# Patient Record
Sex: MALE | Race: WHITE | NOT HISPANIC OR LATINO | Employment: FULL TIME | URBAN - METROPOLITAN AREA
[De-identification: names, ages, dates, MRNs, and addresses within clinical notes are randomized per-mention and may not be internally consistent; named-entity substitution may affect disease eponyms.]

---

## 2023-03-14 LAB — SARS-COV-2 RESULT: NOT DETECTED

## 2023-04-27 LAB
ALANINE AMINOTRANSFERASE (SGPT) (U/L) IN SER/PLAS: 10 U/L (ref 10–52)
ALBUMIN (G/DL) IN SER/PLAS: 4.6 G/DL (ref 3.4–5)
ALKALINE PHOSPHATASE (U/L) IN SER/PLAS: 60 U/L (ref 33–136)
ANION GAP IN SER/PLAS: 12 MMOL/L (ref 10–20)
ASPARTATE AMINOTRANSFERASE (SGOT) (U/L) IN SER/PLAS: 11 U/L (ref 9–39)
BILIRUBIN TOTAL (MG/DL) IN SER/PLAS: 0.9 MG/DL (ref 0–1.2)
CALCIDIOL (25 OH VITAMIN D3) (NG/ML) IN SER/PLAS: 39 NG/ML
CALCIUM (MG/DL) IN SER/PLAS: 9.9 MG/DL (ref 8.6–10.3)
CARBON DIOXIDE, TOTAL (MMOL/L) IN SER/PLAS: 31 MMOL/L (ref 21–32)
CHLORIDE (MMOL/L) IN SER/PLAS: 99 MMOL/L (ref 98–107)
CHOLESTEROL (MG/DL) IN SER/PLAS: 176 MG/DL (ref 0–199)
CHOLESTEROL IN HDL (MG/DL) IN SER/PLAS: 38.4 MG/DL
CHOLESTEROL/HDL RATIO: 4.6
CREATININE (MG/DL) IN SER/PLAS: 0.77 MG/DL (ref 0.5–1.3)
ESTIMATED AVERAGE GLUCOSE FOR HBA1C: 240 MG/DL
GFR MALE: >90 ML/MIN/1.73M2
GLUCOSE (MG/DL) IN SER/PLAS: 262 MG/DL (ref 74–99)
HEMOGLOBIN A1C/HEMOGLOBIN TOTAL IN BLOOD: 10 %
LDL: 82 MG/DL (ref 0–99)
NON HDL CHOLESTEROL: 138 MG/DL
POTASSIUM (MMOL/L) IN SER/PLAS: 4.1 MMOL/L (ref 3.5–5.3)
PROSTATE SPECIFIC AG (NG/ML) IN SER/PLAS: <0.1 NG/ML (ref 0–4)
PROTEIN TOTAL: 7.6 G/DL (ref 6.4–8.2)
SODIUM (MMOL/L) IN SER/PLAS: 138 MMOL/L (ref 136–145)
TRIGLYCERIDE (MG/DL) IN SER/PLAS: 276 MG/DL (ref 0–149)
UREA NITROGEN (MG/DL) IN SER/PLAS: 13 MG/DL (ref 6–23)
VLDL: 55 MG/DL (ref 0–40)

## 2023-07-12 ENCOUNTER — HOSPITAL ENCOUNTER (OUTPATIENT)
Dept: DATA CONVERSION | Facility: HOSPITAL | Age: 63
End: 2023-07-12
Attending: ANESTHESIOLOGY | Admitting: ANESTHESIOLOGY
Payer: COMMERCIAL

## 2023-07-12 DIAGNOSIS — M48.061 SPINAL STENOSIS, LUMBAR REGION WITHOUT NEUROGENIC CLAUDICATION: ICD-10-CM

## 2023-07-12 DIAGNOSIS — M54.9 DORSALGIA, UNSPECIFIED: ICD-10-CM

## 2023-07-25 LAB — PROSTATE SPECIFIC AG (NG/ML) IN SER/PLAS: <0.1 NG/ML (ref 0–4)

## 2023-09-06 LAB
ALANINE AMINOTRANSFERASE (SGPT) (U/L) IN SER/PLAS: 11 U/L (ref 10–52)
ALBUMIN (G/DL) IN SER/PLAS: 4.2 G/DL (ref 3.4–5)
ALKALINE PHOSPHATASE (U/L) IN SER/PLAS: 59 U/L (ref 33–136)
ANION GAP IN SER/PLAS: 8 MMOL/L (ref 10–20)
ASPARTATE AMINOTRANSFERASE (SGOT) (U/L) IN SER/PLAS: 13 U/L (ref 9–39)
BILIRUBIN TOTAL (MG/DL) IN SER/PLAS: 0.8 MG/DL (ref 0–1.2)
CALCIUM (MG/DL) IN SER/PLAS: 9.8 MG/DL (ref 8.6–10.3)
CARBON DIOXIDE, TOTAL (MMOL/L) IN SER/PLAS: 34 MMOL/L (ref 21–32)
CHLORIDE (MMOL/L) IN SER/PLAS: 103 MMOL/L (ref 98–107)
CHOLESTEROL (MG/DL) IN SER/PLAS: 161 MG/DL (ref 0–199)
CHOLESTEROL IN HDL (MG/DL) IN SER/PLAS: 29.7 MG/DL
CHOLESTEROL/HDL RATIO: 5.4
CREATININE (MG/DL) IN SER/PLAS: 0.89 MG/DL (ref 0.5–1.3)
ESTIMATED AVERAGE GLUCOSE FOR HBA1C: 223 MG/DL
GFR MALE: >90 ML/MIN/1.73M2
GLUCOSE (MG/DL) IN SER/PLAS: 196 MG/DL (ref 74–99)
HEMOGLOBIN A1C/HEMOGLOBIN TOTAL IN BLOOD: 9.4 %
LDL: 85 MG/DL (ref 0–99)
NON HDL CHOLESTEROL: 131 MG/DL
POTASSIUM (MMOL/L) IN SER/PLAS: 4.4 MMOL/L (ref 3.5–5.3)
PROTEIN TOTAL: 7.1 G/DL (ref 6.4–8.2)
SODIUM (MMOL/L) IN SER/PLAS: 141 MMOL/L (ref 136–145)
TRIGLYCERIDE (MG/DL) IN SER/PLAS: 232 MG/DL (ref 0–149)
UREA NITROGEN (MG/DL) IN SER/PLAS: 16 MG/DL (ref 6–23)
VLDL: 46 MG/DL (ref 0–40)

## 2023-09-13 ENCOUNTER — HOSPITAL ENCOUNTER (OUTPATIENT)
Dept: DATA CONVERSION | Facility: HOSPITAL | Age: 63
End: 2023-09-13
Attending: ANESTHESIOLOGY | Admitting: ANESTHESIOLOGY
Payer: COMMERCIAL

## 2023-09-13 DIAGNOSIS — M48.061 SPINAL STENOSIS, LUMBAR REGION WITHOUT NEUROGENIC CLAUDICATION: ICD-10-CM

## 2023-09-29 VITALS — BODY MASS INDEX: 27.86 KG/M2 | WEIGHT: 205.69 LBS | HEIGHT: 72 IN

## 2023-09-29 VITALS — BODY MASS INDEX: 27.86 KG/M2 | HEIGHT: 72 IN | WEIGHT: 205.69 LBS

## 2023-10-17 PROBLEM — M62.838 MUSCLE SPASM: Status: ACTIVE | Noted: 2023-10-17

## 2023-10-17 PROBLEM — R35.0 FREQUENCY OF URINATION: Status: ACTIVE | Noted: 2023-10-17

## 2023-10-17 PROBLEM — N13.8 BPH WITH OBSTRUCTION/LOWER URINARY TRACT SYMPTOMS: Status: ACTIVE | Noted: 2023-10-17

## 2023-10-17 PROBLEM — R39.11 HESITANCY: Status: ACTIVE | Noted: 2023-10-17

## 2023-10-17 PROBLEM — C61 PROSTATE CANCER (MULTI): Status: ACTIVE | Noted: 2023-10-17

## 2023-10-17 PROBLEM — R04.0 EPISTAXIS: Status: ACTIVE | Noted: 2023-10-17

## 2023-10-17 PROBLEM — F41.9 ANXIETY: Status: ACTIVE | Noted: 2023-10-17

## 2023-10-17 PROBLEM — R07.89 CHEST WALL PAIN: Status: ACTIVE | Noted: 2023-10-17

## 2023-10-17 PROBLEM — M54.16 LUMBAR RADICULAR PAIN: Status: ACTIVE | Noted: 2023-10-17

## 2023-10-17 PROBLEM — M17.11 OSTEOARTHRITIS OF RIGHT KNEE: Status: ACTIVE | Noted: 2023-10-17

## 2023-10-17 PROBLEM — N40.1 BPH WITH OBSTRUCTION/LOWER URINARY TRACT SYMPTOMS: Status: ACTIVE | Noted: 2023-10-17

## 2023-10-17 PROBLEM — M51.369 OTHER INTERVERTEBRAL DISC DEGENERATION, LUMBAR REGION: Status: ACTIVE | Noted: 2023-10-17

## 2023-10-17 PROBLEM — M94.0 COSTOCHONDRITIS, ACUTE: Status: ACTIVE | Noted: 2023-10-17

## 2023-10-17 PROBLEM — R39.12 WEAK URINARY STREAM: Status: ACTIVE | Noted: 2023-10-17

## 2023-10-17 PROBLEM — M51.36 OTHER INTERVERTEBRAL DISC DEGENERATION, LUMBAR REGION: Status: ACTIVE | Noted: 2023-10-17

## 2023-10-17 PROBLEM — K11.8 PAROTID MASS: Status: ACTIVE | Noted: 2023-10-17

## 2023-10-17 PROBLEM — M48.07 SPINAL STENOSIS OF LUMBOSACRAL REGION: Status: ACTIVE | Noted: 2023-10-17

## 2023-10-17 PROBLEM — R91.1 PULMONARY NODULE: Status: ACTIVE | Noted: 2023-10-17

## 2023-10-17 PROBLEM — M18.11 PRIMARY OSTEOARTHRITIS OF FIRST CARPOMETACARPAL JOINT OF RIGHT HAND: Status: ACTIVE | Noted: 2023-10-17

## 2023-10-17 RX ORDER — BLOOD-GLUCOSE SENSOR
EACH MISCELLANEOUS
COMMUNITY
Start: 2023-09-08

## 2023-10-17 RX ORDER — CYCLOBENZAPRINE HCL 10 MG
1 TABLET ORAL NIGHTLY PRN
COMMUNITY
Start: 2022-09-29

## 2023-10-17 RX ORDER — GABAPENTIN 300 MG/1
CAPSULE ORAL
COMMUNITY
Start: 2023-07-03 | End: 2023-11-10

## 2023-10-17 RX ORDER — CARBOXYMETHYLCELLULOSE SODIUM 0.5 %
17 DROPPERETTE, SINGLE-USE DROP DISPENSER OPHTHALMIC (EYE) DAILY PRN
COMMUNITY
Start: 2023-03-17

## 2023-10-17 RX ORDER — OMEGA-3 FATTY ACIDS 1000 MG
1 CAPSULE ORAL 2 TIMES DAILY
COMMUNITY

## 2023-10-17 RX ORDER — SILDENAFIL 100 MG/1
TABLET, FILM COATED ORAL
COMMUNITY
Start: 2023-09-09

## 2023-10-17 RX ORDER — IBUPROFEN 600 MG/1
600 TABLET ORAL 3 TIMES DAILY PRN
COMMUNITY
End: 2023-10-18 | Stop reason: SDUPTHER

## 2023-10-17 RX ORDER — PEN NEEDLE, DIABETIC 31 GX5/16"
NEEDLE, DISPOSABLE MISCELLANEOUS
COMMUNITY
Start: 2023-05-31

## 2023-10-17 RX ORDER — GLIMEPIRIDE 4 MG/1
1 TABLET ORAL DAILY
COMMUNITY

## 2023-10-17 RX ORDER — LISINOPRIL 5 MG/1
TABLET ORAL
COMMUNITY
Start: 2019-09-13

## 2023-10-17 RX ORDER — ERGOCALCIFEROL 1.25 MG/1
CAPSULE ORAL
COMMUNITY
Start: 2019-09-13 | End: 2023-11-10 | Stop reason: SDUPTHER

## 2023-10-17 RX ORDER — NAPROXEN SODIUM 220 MG/1
1 TABLET, FILM COATED ORAL DAILY
COMMUNITY

## 2023-10-17 RX ORDER — CLOPIDOGREL BISULFATE 75 MG/1
TABLET, FILM COATED ORAL
COMMUNITY

## 2023-10-17 RX ORDER — TAMSULOSIN HYDROCHLORIDE 0.4 MG/1
0.4 CAPSULE ORAL NIGHTLY
COMMUNITY

## 2023-10-17 RX ORDER — EMPAGLIFLOZIN 25 MG/1
TABLET, FILM COATED ORAL
COMMUNITY
Start: 2023-06-25

## 2023-10-17 RX ORDER — NITROGLYCERIN 0.4 MG/1
TABLET SUBLINGUAL
COMMUNITY

## 2023-10-17 RX ORDER — ATORVASTATIN CALCIUM 40 MG/1
TABLET, FILM COATED ORAL
COMMUNITY
Start: 2020-05-28

## 2023-10-17 RX ORDER — METFORMIN HYDROCHLORIDE 500 MG/1
1 TABLET ORAL 2 TIMES DAILY
COMMUNITY

## 2023-10-17 RX ORDER — INSULIN LISPRO 100 [IU]/ML
INJECTION, SOLUTION INTRAVENOUS; SUBCUTANEOUS
COMMUNITY
Start: 2023-05-31

## 2023-10-17 RX ORDER — ASPIRIN 325 MG
1 TABLET, DELAYED RELEASE (ENTERIC COATED) ORAL
COMMUNITY

## 2023-10-17 RX ORDER — GLUCOSAMINE/CHONDR SU A SOD 167-133 MG
1 CAPSULE ORAL DAILY
COMMUNITY

## 2023-10-17 RX ORDER — DIAZEPAM 5 MG/1
TABLET ORAL
COMMUNITY
Start: 2023-06-14

## 2023-10-17 RX ORDER — OXYCODONE AND ACETAMINOPHEN 5; 325 MG/1; MG/1
1 TABLET ORAL EVERY 6 HOURS PRN
COMMUNITY
Start: 2022-09-23

## 2023-10-17 RX ORDER — FENOFIBRATE 200 MG/1
CAPSULE ORAL
COMMUNITY
Start: 2019-07-23

## 2023-10-17 RX ORDER — TADALAFIL 5 MG/1
5 TABLET ORAL DAILY
COMMUNITY
Start: 2023-04-11

## 2023-10-18 ENCOUNTER — OFFICE VISIT (OUTPATIENT)
Dept: PAIN MEDICINE | Facility: CLINIC | Age: 63
End: 2023-10-18
Payer: COMMERCIAL

## 2023-10-18 VITALS
HEIGHT: 72 IN | TEMPERATURE: 97 F | WEIGHT: 203 LBS | BODY MASS INDEX: 27.5 KG/M2 | OXYGEN SATURATION: 95 % | DIASTOLIC BLOOD PRESSURE: 66 MMHG | HEART RATE: 78 BPM | SYSTOLIC BLOOD PRESSURE: 125 MMHG | RESPIRATION RATE: 16 BRPM

## 2023-10-18 DIAGNOSIS — M48.07 SPINAL STENOSIS OF LUMBOSACRAL REGION: Primary | ICD-10-CM

## 2023-10-18 DIAGNOSIS — M54.16 LUMBAR RADICULOPATHY: ICD-10-CM

## 2023-10-18 PROCEDURE — 99213 OFFICE O/P EST LOW 20 MIN: CPT | Performed by: ANESTHESIOLOGY

## 2023-10-18 RX ORDER — IBUPROFEN 600 MG/1
600 TABLET ORAL 3 TIMES DAILY PRN
Qty: 90 TABLET | Refills: 0 | Status: SHIPPED | OUTPATIENT
Start: 2023-10-18 | End: 2023-11-17

## 2023-10-18 RX ORDER — CALCIUM CARBONATE/VITAMIN D3 600MG-62.5
CAPSULE ORAL
COMMUNITY

## 2023-10-18 ASSESSMENT — ENCOUNTER SYMPTOMS
FEVER: 0
BACK PAIN: 1
SHORTNESS OF BREATH: 0

## 2023-10-18 ASSESSMENT — PAIN SCALES - GENERAL: PAINLEVEL_OUTOF10: 6

## 2023-10-18 ASSESSMENT — PAIN - FUNCTIONAL ASSESSMENT: PAIN_FUNCTIONAL_ASSESSMENT: 0-10

## 2023-10-18 ASSESSMENT — LIFESTYLE VARIABLES: TOTAL SCORE: 0

## 2023-10-18 ASSESSMENT — PAIN DESCRIPTION - DESCRIPTORS: DESCRIPTORS: SHARP

## 2023-10-18 NOTE — PROGRESS NOTES
Pt c/o low back pain that radiated down his left leg.  Had LESI on 9/13 that did not help this time.

## 2023-10-18 NOTE — H&P (VIEW-ONLY)
Chief Complain  Back Pain       History Of Present Illness  Serafin Saglado is a 63 y.o. male here for left sided low back pain radiating to left thigh . The patient rates the pain at 4-5  on a scale from 0-10.  The patient describes pain as sharp, dull.  The pain is worsened by walking and is alleviated by sitting.  Since the last visit the pain has stayed the same.  The patient denies any fever, chills, weight loss, bladder/bowel incontinence.     Previous Procedures:  L2-3 SUKHI X 2 : First one helped for 2 months and second one did not help much    Past Medical History  He has no past medical history on file.    Surgical History  He has no past surgical history on file.     Social History  He reports that he has been smoking cigarettes. He has a 20.00 pack-year smoking history. He does not have any smokeless tobacco history on file. He reports that he does not currently use alcohol. He reports that he does not currently use drugs.    Family History  No family history on file.     Allergies  Gabapentin    Review of Systems  Review of Systems   Constitutional:  Negative for fever.   Respiratory:  Negative for shortness of breath.    Cardiovascular:  Negative for chest pain.   Musculoskeletal:  Positive for back pain.   Psychiatric/Behavioral:  Negative for suicidal ideas.         Physical Exam  Physical Exam  HENT:      Head: Normocephalic and atraumatic.   Eyes:      Extraocular Movements: Extraocular movements intact.      Pupils: Pupils are equal, round, and reactive to light.   Pulmonary:      Effort: Pulmonary effort is normal.   Musculoskeletal:      Cervical back: Neck supple.   Neurological:      Mental Status: He is alert.   Psychiatric:         Mood and Affect: Mood normal.           Last Recorded Vitals  Blood pressure 125/66, pulse 78, temperature 36.1 °C (97 °F), resp. rate 16, height 1.829 m (6'), weight 92.1 kg (203 lb), SpO2 95 %.       Assessment/Plan     Serafin Salgado is a 63 y.o. male here for follow-up  of low back pain worse on the left side radiating to left front of the thigh.  Is been experiencing the symptoms since September of last year.  Review of his MRI did reveal L2-3 disc extrusion causing significant spinal canal and lateral stenosis with potential impingement of L3 nerve root.  He is status post L2-3 oral steroid injection x2 the first injection provided him with significant relief of pain for 2 months the second 1 only lasted for a week or so.  I would recommend trial of transforaminal L3, L3-4 epidural steroid injection on the left side to get some better coverage.  If he does not respond adequately would refer to spine surgery for consideration surgical options.  He has previously failed treatment with opioids only medication which is helped him so far has been NSAIDs.  Currently managing his pain with ibuprofen for last 3 to 4 months, discussed risks of using ibuprofen in conjunction with Plavix which may cause bleeding issues especially upper GI bleed.  Educated him on further signs of upper GI bleeding, stop ibuprofen right away and seek urgent medical attention.  Asked him to try 1000 mg of Tylenol in the morning reduce the amount of ibuprofen he is using.    I spent 22 minutes in the professional and overall care of this patient.       Evgeny Cope MD

## 2023-10-18 NOTE — PROGRESS NOTES
Chief Complain  Back Pain       History Of Present Illness  Serafin Salgado is a 63 y.o. male here for left sided low back pain radiating to left thigh . The patient rates the pain at 4-5  on a scale from 0-10.  The patient describes pain as sharp, dull.  The pain is worsened by walking and is alleviated by sitting.  Since the last visit the pain has stayed the same.  The patient denies any fever, chills, weight loss, bladder/bowel incontinence.     Previous Procedures:  L2-3 SUKHI X 2 : First one helped for 2 months and second one did not help much    Past Medical History  He has no past medical history on file.    Surgical History  He has no past surgical history on file.     Social History  He reports that he has been smoking cigarettes. He has a 20.00 pack-year smoking history. He does not have any smokeless tobacco history on file. He reports that he does not currently use alcohol. He reports that he does not currently use drugs.    Family History  No family history on file.     Allergies  Gabapentin    Review of Systems  Review of Systems   Constitutional:  Negative for fever.   Respiratory:  Negative for shortness of breath.    Cardiovascular:  Negative for chest pain.   Musculoskeletal:  Positive for back pain.   Psychiatric/Behavioral:  Negative for suicidal ideas.         Physical Exam  Physical Exam  HENT:      Head: Normocephalic and atraumatic.   Eyes:      Extraocular Movements: Extraocular movements intact.      Pupils: Pupils are equal, round, and reactive to light.   Pulmonary:      Effort: Pulmonary effort is normal.   Musculoskeletal:      Cervical back: Neck supple.   Neurological:      Mental Status: He is alert.   Psychiatric:         Mood and Affect: Mood normal.           Last Recorded Vitals  Blood pressure 125/66, pulse 78, temperature 36.1 °C (97 °F), resp. rate 16, height 1.829 m (6'), weight 92.1 kg (203 lb), SpO2 95 %.       Assessment/Plan     Serafin Salgado is a 63 y.o. male here for follow-up  of low back pain worse on the left side radiating to left front of the thigh.  Is been experiencing the symptoms since September of last year.  Review of his MRI did reveal L2-3 disc extrusion causing significant spinal canal and lateral stenosis with potential impingement of L3 nerve root.  He is status post L2-3 oral steroid injection x2 the first injection provided him with significant relief of pain for 2 months the second 1 only lasted for a week or so.  I would recommend trial of transforaminal L3, L3-4 epidural steroid injection on the left side to get some better coverage.  If he does not respond adequately would refer to spine surgery for consideration surgical options.  He has previously failed treatment with opioids only medication which is helped him so far has been NSAIDs.  Currently managing his pain with ibuprofen for last 3 to 4 months, discussed risks of using ibuprofen in conjunction with Plavix which may cause bleeding issues especially upper GI bleed.  Educated him on further signs of upper GI bleeding, stop ibuprofen right away and seek urgent medical attention.  Asked him to try 1000 mg of Tylenol in the morning reduce the amount of ibuprofen he is using.    I spent 22 minutes in the professional and overall care of this patient.       Evgeny Cope MD

## 2023-11-10 ENCOUNTER — APPOINTMENT (OUTPATIENT)
Dept: PAIN MEDICINE | Facility: CLINIC | Age: 63
End: 2023-11-10
Payer: COMMERCIAL

## 2023-11-10 ENCOUNTER — ANCILLARY PROCEDURE (OUTPATIENT)
Dept: RADIOLOGY | Facility: CLINIC | Age: 63
End: 2023-11-10
Payer: COMMERCIAL

## 2023-11-10 ENCOUNTER — HOSPITAL ENCOUNTER (OUTPATIENT)
Dept: PAIN MEDICINE | Facility: CLINIC | Age: 63
Discharge: HOME | End: 2023-11-10
Payer: COMMERCIAL

## 2023-11-10 ENCOUNTER — APPOINTMENT (OUTPATIENT)
Dept: RADIOLOGY | Facility: CLINIC | Age: 63
End: 2023-11-10
Payer: COMMERCIAL

## 2023-11-10 VITALS
RESPIRATION RATE: 18 BRPM | TEMPERATURE: 96.1 F | DIASTOLIC BLOOD PRESSURE: 82 MMHG | SYSTOLIC BLOOD PRESSURE: 145 MMHG | OXYGEN SATURATION: 97 % | HEART RATE: 75 BPM

## 2023-11-10 DIAGNOSIS — M48.07 SPINAL STENOSIS OF LUMBOSACRAL REGION: ICD-10-CM

## 2023-11-10 DIAGNOSIS — M54.16 LUMBAR RADICULOPATHY: ICD-10-CM

## 2023-11-10 PROCEDURE — 2500000005 HC RX 250 GENERAL PHARMACY W/O HCPCS

## 2023-11-10 PROCEDURE — 64483 NJX AA&/STRD TFRM EPI L/S 1: CPT | Performed by: ANESTHESIOLOGY

## 2023-11-10 PROCEDURE — 2500000004 HC RX 250 GENERAL PHARMACY W/ HCPCS (ALT 636 FOR OP/ED)

## 2023-11-10 PROCEDURE — 64484 NJX AA&/STRD TFRM EPI L/S EA: CPT | Performed by: ANESTHESIOLOGY

## 2023-11-10 PROCEDURE — A4216 STERILE WATER/SALINE, 10 ML: HCPCS

## 2023-11-10 PROCEDURE — 7100000009 HC PHASE TWO TIME - INITIAL BASE CHARGE

## 2023-11-10 PROCEDURE — 77003 FLUOROGUIDE FOR SPINE INJECT: CPT

## 2023-11-10 RX ORDER — DEXAMETHASONE SODIUM PHOSPHATE 10 MG/ML
INJECTION INTRAMUSCULAR; INTRAVENOUS
Status: DISPENSED
Start: 2023-11-10 | End: 2023-11-10

## 2023-11-10 RX ORDER — SODIUM CHLORIDE 9 MG/ML
INJECTION, SOLUTION INTRAMUSCULAR; INTRAVENOUS; SUBCUTANEOUS
Status: COMPLETED
Start: 2023-11-10 | End: 2023-11-10

## 2023-11-10 RX ORDER — LIDOCAINE HYDROCHLORIDE 10 MG/ML
INJECTION, SOLUTION EPIDURAL; INFILTRATION; INTRACAUDAL; PERINEURAL
Status: COMPLETED
Start: 2023-11-10 | End: 2023-11-10

## 2023-11-10 RX ORDER — METFORMIN HYDROCHLORIDE 500 MG/1
TABLET, EXTENDED RELEASE ORAL
COMMUNITY
Start: 2023-09-25 | End: 2023-11-10 | Stop reason: SDUPTHER

## 2023-11-10 RX ORDER — ROPIVACAINE HYDROCHLORIDE 5 MG/ML
INJECTION, SOLUTION EPIDURAL; INFILTRATION; PERINEURAL
Status: COMPLETED
Start: 2023-11-10 | End: 2023-11-10

## 2023-11-10 RX ORDER — METHYLPREDNISOLONE ACETATE 40 MG/ML
INJECTION, SUSPENSION INTRA-ARTICULAR; INTRALESIONAL; INTRAMUSCULAR; SOFT TISSUE
Status: DISCONTINUED
Start: 2023-11-10 | End: 2023-11-10 | Stop reason: WASHOUT

## 2023-11-10 RX ORDER — DEXAMETHASONE SODIUM PHOSPHATE 10 MG/ML
INJECTION INTRAMUSCULAR; INTRAVENOUS
Status: COMPLETED
Start: 2023-11-10 | End: 2023-11-10

## 2023-11-10 RX ADMIN — LIDOCAINE HYDROCHLORIDE 50 MG: 10 INJECTION, SOLUTION EPIDURAL; INFILTRATION; INTRACAUDAL; PERINEURAL at 10:00

## 2023-11-10 RX ADMIN — SODIUM CHLORIDE 10 ML: 9 INJECTION, SOLUTION INTRAMUSCULAR; INTRAVENOUS; SUBCUTANEOUS at 10:00

## 2023-11-10 RX ADMIN — DEXAMETHASONE SODIUM PHOSPHATE 10 MG: 10 INJECTION, SOLUTION INTRAMUSCULAR; INTRAVENOUS at 10:00

## 2023-11-10 RX ADMIN — ROPIVACAINE HYDROCHLORIDE 100 MG: 5 INJECTION, SOLUTION EPIDURAL; INFILTRATION; PERINEURAL at 10:00

## 2023-11-10 ASSESSMENT — PAIN - FUNCTIONAL ASSESSMENT
PAIN_FUNCTIONAL_ASSESSMENT: 0-10
PAIN_FUNCTIONAL_ASSESSMENT: 0-10

## 2023-11-10 ASSESSMENT — PAIN SCALES - GENERAL: PAINLEVEL_OUTOF10: 3

## 2023-11-10 ASSESSMENT — ENCOUNTER SYMPTOMS
LOSS OF SENSATION IN FEET: 0
OCCASIONAL FEELINGS OF UNSTEADINESS: 0
DEPRESSION: 0

## 2023-11-10 ASSESSMENT — PAIN DESCRIPTION - DESCRIPTORS: DESCRIPTORS: SHARP;SPASM

## 2023-11-10 NOTE — Clinical Note
Patient ID band present and verified. Patient contact is in patient room. Left transforaminal injection

## 2023-11-10 NOTE — OP NOTE
Procedure Note     Date: 11/10/2023  OR Location: [facility] NON-OR PROCEDURES    Name: Serafin Salgado : 1960, Age: 63 y.o., MRN: [id number], Sex: male    Diagnosis  Preprocedure diagnosis: Lumbar radiculopathy  Postprocedure diagnosis: Same    Procedures  Lumbar transforaminal epidural steroid injection    The patient was seen in the preoperative area. The risks, benefits, complications, treatment options, non-operative alternatives, expected recovery and outcomes were discussed with the patient. The patient concurred with the proposed plan, giving informed consent.          Procedure: The risks and benefits of treatment options and alternatives were discussed with the patient, and consent was obtained for a cervical epidural steroid injection. She wishes to proceed. She was placed in a prone position. The area overlying the left L2-3 and Left L3-4 spaces was cleaned with ChloraPrep solution and draped using standard sterile precautions. Skin was anesthetized with 1% lidocaine. Two 5 inch 25-gauge spinal needles was advanced with AP, lateral, oblique fluoroscopy to the left L2-3 and Left L3-4 transforaminal spaces. No paresthesias were induced. 1 cc of Omnipaque was injected demonstrating spread of the dye covering the L2 and L3 nerve roots as well as in the epidural space. No intravascular spread was noticed. After negative aspiration for blood and CSF, a solution containing 10 mg of dexamethasone and 2 mL of 0.5% ropivacaine was injected at each level without inducing paresthesia or pain. Patient was transferred to recovery in stable condition and subsequently discharged home.         Complications:  None; patient tolerated the procedure well.    Disposition: Home  Condition: stable         Additional Details: NA    Attending Attestation: I performed the procedure.    Evgeny Cope MD

## 2023-11-13 ENCOUNTER — APPOINTMENT (OUTPATIENT)
Dept: PAIN MEDICINE | Facility: CLINIC | Age: 63
End: 2023-11-13

## 2023-12-14 ENCOUNTER — LAB (OUTPATIENT)
Dept: LAB | Facility: LAB | Age: 63
End: 2023-12-14
Payer: COMMERCIAL

## 2023-12-14 DIAGNOSIS — E78.5 HYPERLIPIDEMIA, UNSPECIFIED: Primary | ICD-10-CM

## 2023-12-14 DIAGNOSIS — E11.9 TYPE 2 DIABETES MELLITUS WITHOUT COMPLICATIONS (MULTI): ICD-10-CM

## 2023-12-14 LAB
ALBUMIN SERPL BCP-MCNC: 4.6 G/DL (ref 3.4–5)
ALP SERPL-CCNC: 45 U/L (ref 33–136)
ALT SERPL W P-5'-P-CCNC: 12 U/L (ref 10–52)
ANION GAP SERPL CALC-SCNC: 10 MMOL/L (ref 10–20)
AST SERPL W P-5'-P-CCNC: 13 U/L (ref 9–39)
BILIRUB SERPL-MCNC: 0.6 MG/DL (ref 0–1.2)
BUN SERPL-MCNC: 18 MG/DL (ref 6–23)
CALCIUM SERPL-MCNC: 10.1 MG/DL (ref 8.6–10.3)
CHLORIDE SERPL-SCNC: 104 MMOL/L (ref 98–107)
CO2 SERPL-SCNC: 31 MMOL/L (ref 21–32)
CREAT SERPL-MCNC: 0.83 MG/DL (ref 0.5–1.3)
EST. AVERAGE GLUCOSE BLD GHB EST-MCNC: 197 MG/DL
GFR SERPL CREATININE-BSD FRML MDRD: >90 ML/MIN/1.73M*2
GLUCOSE SERPL-MCNC: 153 MG/DL (ref 74–99)
HBA1C MFR BLD: 8.5 %
POTASSIUM SERPL-SCNC: 4.3 MMOL/L (ref 3.5–5.3)
PROT SERPL-MCNC: 7.3 G/DL (ref 6.4–8.2)
SODIUM SERPL-SCNC: 141 MMOL/L (ref 136–145)

## 2023-12-14 PROCEDURE — 83036 HEMOGLOBIN GLYCOSYLATED A1C: CPT

## 2023-12-14 PROCEDURE — 80053 COMPREHEN METABOLIC PANEL: CPT

## 2023-12-22 ENCOUNTER — OFFICE VISIT (OUTPATIENT)
Dept: PAIN MEDICINE | Facility: CLINIC | Age: 63
End: 2023-12-22
Payer: COMMERCIAL

## 2023-12-22 VITALS
RESPIRATION RATE: 18 BRPM | HEIGHT: 72 IN | SYSTOLIC BLOOD PRESSURE: 137 MMHG | WEIGHT: 203 LBS | HEART RATE: 82 BPM | DIASTOLIC BLOOD PRESSURE: 73 MMHG | TEMPERATURE: 98.1 F | BODY MASS INDEX: 27.5 KG/M2

## 2023-12-22 DIAGNOSIS — M54.16 LUMBAR RADICULOPATHY: Primary | ICD-10-CM

## 2023-12-22 PROCEDURE — 99213 OFFICE O/P EST LOW 20 MIN: CPT | Performed by: ANESTHESIOLOGY

## 2023-12-22 ASSESSMENT — PATIENT HEALTH QUESTIONNAIRE - PHQ9
2. FEELING DOWN, DEPRESSED OR HOPELESS: NOT AT ALL
SUM OF ALL RESPONSES TO PHQ9 QUESTIONS 1 AND 2: 0
1. LITTLE INTEREST OR PLEASURE IN DOING THINGS: NOT AT ALL

## 2023-12-22 ASSESSMENT — ENCOUNTER SYMPTOMS
SHORTNESS OF BREATH: 0
OCCASIONAL FEELINGS OF UNSTEADINESS: 0
DEPRESSION: 0
FEVER: 0
BACK PAIN: 1
LOSS OF SENSATION IN FEET: 0

## 2023-12-22 ASSESSMENT — COLUMBIA-SUICIDE SEVERITY RATING SCALE - C-SSRS
6. HAVE YOU EVER DONE ANYTHING, STARTED TO DO ANYTHING, OR PREPARED TO DO ANYTHING TO END YOUR LIFE?: NO
2. HAVE YOU ACTUALLY HAD ANY THOUGHTS OF KILLING YOURSELF?: NO
1. IN THE PAST MONTH, HAVE YOU WISHED YOU WERE DEAD OR WISHED YOU COULD GO TO SLEEP AND NOT WAKE UP?: NO

## 2023-12-22 ASSESSMENT — PAIN DESCRIPTION - DESCRIPTORS: DESCRIPTORS: SPASM

## 2023-12-22 ASSESSMENT — PAIN SCALES - GENERAL
PAINLEVEL: 6
PAINLEVEL_OUTOF10: 6

## 2023-12-22 ASSESSMENT — PAIN - FUNCTIONAL ASSESSMENT: PAIN_FUNCTIONAL_ASSESSMENT: 0-10

## 2023-12-22 ASSESSMENT — LIFESTYLE VARIABLES: TOTAL SCORE: 0

## 2023-12-22 NOTE — PROGRESS NOTES
Chief Complain  Follow-up (From 11/10/23 with 50% relif for 4-5 days and was worse. Currently taking motrin for pain, not helping. Current pain in low back shooting to right thigh.)       History Of Present Illness  Serafin Salgado is a 63 y.o. male here for low back radiating to left thigh pain . The patient rates the pain at 5-6  on a scale from 0-10.  The patient describes pain as spasming.  The pain is worsened by no aggravating factors identified and is alleviated by lying down.  Since the last visit the pain has stayed the same.  The patient denies any fever, chills, weight loss, bladder/bowel incontinence.         Past Medical History  He has no past medical history on file.    Surgical History  He has no past surgical history on file.     Social History  He reports that he has been smoking cigarettes. He has a 20.00 pack-year smoking history. He has never used smokeless tobacco. He reports that he does not currently use alcohol. He reports that he does not currently use drugs.    Family History  No family history on file.     Allergies  Gabapentin    Review of Systems  Review of Systems   Constitutional:  Negative for fever.   Respiratory:  Negative for shortness of breath.    Cardiovascular:  Negative for chest pain.   Musculoskeletal:  Positive for back pain.   Psychiatric/Behavioral:  Negative for suicidal ideas.         Physical Exam  Physical Exam  HENT:      Head: Normocephalic and atraumatic.   Eyes:      Extraocular Movements: Extraocular movements intact.   Pulmonary:      Effort: Pulmonary effort is normal.   Musculoskeletal:      Cervical back: Neck supple.   Neurological:      Mental Status: He is alert.   Psychiatric:         Mood and Affect: Mood normal.           Last Recorded Vitals  Blood pressure 137/73, pulse 82, temperature 36.7 °C (98.1 °F), resp. rate 18, height 1.829 m (6'), weight 92.1 kg (203 lb).       Assessment/Plan     Serafin Salgado is a 63 y.o. male here for follow-up of low back pain  radiating to left lower extremity to the front of the thigh.  His MRI does reveal L2-3 disc extrusion causing spinal canal and lateral recess stenosis with impingement of L3 nerve root.  He has had 2 epidural steroid injection as well as 1 transforaminal epidural steroid injection which provided him with only short-term relief of pain.  He has previously failed treatment with opioids, does get some benefit with NSAIDs however he is on Plavix which make it high risk medication for him.  Given the lack of response to conservative treatment I would recommend referral to spine surgery for consideration of surgical options.    Evgeny Cope MD

## 2024-01-24 ENCOUNTER — LAB (OUTPATIENT)
Dept: LAB | Facility: LAB | Age: 64
End: 2024-01-24
Payer: COMMERCIAL

## 2024-01-24 DIAGNOSIS — R97.20 ELEVATED PROSTATE SPECIFIC ANTIGEN (PSA): Primary | ICD-10-CM

## 2024-01-24 LAB — PSA SERPL-MCNC: 0.18 NG/ML

## 2024-01-24 PROCEDURE — 36415 COLL VENOUS BLD VENIPUNCTURE: CPT

## 2024-01-24 PROCEDURE — 84153 ASSAY OF PSA TOTAL: CPT

## 2024-02-01 NOTE — PROGRESS NOTES
Subjective     Serafin Salgado is a 63 y.o. male with Patient is a 63 year old male with pT3b high risk GGG 4 prostate cancer s/p radical prostatectomy and BPLND with negative lymph nodes, no seminal vesical invasion but extra prostatic extension and positive margins here for 6 month FUV.       He is seeing a neurosurgeon next week for treatment of spinal stenosis.      PSA: 0.18. 1/2024     HALEY: No leakage.      ED: Nerve-sparing on right side, will monitor.      Past medical, surgical, family and social history were reviewed and unchanged since last visit besides what is in the HPI.            Review of Systems   All other systems reviewed and are negative.      Objective   Physical Exam  Gen: No acute distress     Psych: Alert and oriented x3     Neuro:  Normal ROM    Resp: Nonlabored respirations     CV: Regular rate and rhythm     Abd: S, NT, ND.     : Deferred    Skin: Warm, dry and intact without rashes     Lymphatics: No peripheral edema       Assessment/Plan   Problem List Items Addressed This Visit             ICD-10-CM       Hematology and Neoplasia    Prostate cancer (CMS/HCC) - Primary C61     PSA: 0.18. 1/2024. We discussed biochemical recurrence and the 0.20 threshold that will trigger consideration of further treatment. We discussed that after a prostatectomy, PSA is a protein that can only be made by prostate cancer. I offered him a repeat PSA in 3 months or referral to medical and radiation oncology now.      HALEY: No leakage.      ED: Nerve-sparing on right side, will monitor.                Plan:  3 month FUV with repeat PSA  referral to radiation oncology and medical oncology for PSA nearing BCR and high risk disease.          Scribe Attestation  By signing my name below, I, Katelyn Casalla, Scribe   attest that this documentation has been prepared under the direction and in the presence of Adrien Peguero MD MPH.

## 2024-02-02 ENCOUNTER — OFFICE VISIT (OUTPATIENT)
Dept: UROLOGY | Facility: CLINIC | Age: 64
End: 2024-02-02
Payer: COMMERCIAL

## 2024-02-02 VITALS
SYSTOLIC BLOOD PRESSURE: 133 MMHG | TEMPERATURE: 97.9 F | HEART RATE: 82 BPM | BODY MASS INDEX: 29.29 KG/M2 | DIASTOLIC BLOOD PRESSURE: 74 MMHG | WEIGHT: 216 LBS

## 2024-02-02 DIAGNOSIS — C61 PROSTATE CANCER (MULTI): Primary | ICD-10-CM

## 2024-02-02 PROCEDURE — 99213 OFFICE O/P EST LOW 20 MIN: CPT | Performed by: STUDENT IN AN ORGANIZED HEALTH CARE EDUCATION/TRAINING PROGRAM

## 2024-02-02 NOTE — ASSESSMENT & PLAN NOTE
PSA: 0.18. 1/2024. We discussed biochemical recurrence and the 0.20 threshold that will trigger consideration of further treatment. We discussed that after a prostatectomy, PSA is a protein that can only be made by prostate cancer. I offered him a repeat PSA in 3 months or referral to medical and radiation oncology now.      HALEY: No leakage.      ED: Nerve-sparing on right side, will monitor.

## 2024-02-05 ENCOUNTER — OFFICE VISIT (OUTPATIENT)
Dept: NEUROSURGERY | Facility: CLINIC | Age: 64
End: 2024-02-05
Payer: COMMERCIAL

## 2024-02-05 VITALS
WEIGHT: 215 LBS | SYSTOLIC BLOOD PRESSURE: 138 MMHG | BODY MASS INDEX: 29.12 KG/M2 | DIASTOLIC BLOOD PRESSURE: 72 MMHG | HEIGHT: 72 IN | TEMPERATURE: 97.3 F | HEART RATE: 95 BPM

## 2024-02-05 DIAGNOSIS — M54.16 LUMBAR RADICULOPATHY: Primary | ICD-10-CM

## 2024-02-05 PROCEDURE — 99204 OFFICE O/P NEW MOD 45 MIN: CPT | Performed by: STUDENT IN AN ORGANIZED HEALTH CARE EDUCATION/TRAINING PROGRAM

## 2024-02-05 RX ORDER — INSULIN GLARGINE-YFGN 100 [IU]/ML
INJECTION, SOLUTION SUBCUTANEOUS
COMMUNITY
Start: 2024-01-03

## 2024-02-05 ASSESSMENT — LIFESTYLE VARIABLES
HOW MANY STANDARD DRINKS CONTAINING ALCOHOL DO YOU HAVE ON A TYPICAL DAY: 1 OR 2
HOW OFTEN DO YOU HAVE SIX OR MORE DRINKS ON ONE OCCASION: NEVER
HOW OFTEN DO YOU HAVE A DRINK CONTAINING ALCOHOL: MONTHLY OR LESS
AUDIT-C TOTAL SCORE: 1
SKIP TO QUESTIONS 9-10: 1

## 2024-02-05 ASSESSMENT — PATIENT HEALTH QUESTIONNAIRE - PHQ9
1. LITTLE INTEREST OR PLEASURE IN DOING THINGS: NOT AT ALL
SUM OF ALL RESPONSES TO PHQ9 QUESTIONS 1 & 2: 0
2. FEELING DOWN, DEPRESSED OR HOPELESS: NOT AT ALL

## 2024-02-05 ASSESSMENT — PAIN SCALES - GENERAL: PAINLEVEL: 5

## 2024-02-05 NOTE — PROGRESS NOTES
[facility] [place]  Department of Neurological Surgery  New Patient Visit    History of Present Illness:  Serafin Salgado is a 63 y.o. year old male who presents to the spine clinic with low back pain radiating into the left leg.    He has signs and symptoms of spinal stenosis and lumbar radiculopathy. He has back pain radiating into the hip into the L3 distribution of the left leg. His symptoms have been going on for about 5 months. The more he does, the more he hurts. He has attempted conservative care with PT, pain management, and injections with only slight improvement.    He has a past medical history of metastatic prostate cancer which is being monitored with PSA testing and is scheduled for surveillence lab work at the end of February 2024 to determine further care regarding his cancer. He has diabetes, his last HbA1c was 8.4. He is a smoker.     Prior Spine Surgeries: None    Physical Therapy: yes in [id number]  Diabetic: Yes  Osteoporosis: No  Patient's BMI is Body mass index is 29.16 kg/m².    Review of Systems:  14/14 systems reviewed and negative other than what is listed in the history of present illness    Patient Active Problem List   Diagnosis    Weak urinary stream    Pulmonary nodule    Prostate cancer (CMS/HCC)    Primary osteoarthritis of first carpometacarpal joint of right hand    Parotid mass    Other intervertebral disc degeneration, lumbar region    Osteoarthritis of right knee    Muscle spasm    Lumbar radicular pain    Hesitancy    Frequency of urination    Epistaxis    Costochondritis, acute    BPH with obstruction/lower urinary tract symptoms    Chest wall pain    Anxiety    Spinal stenosis of lumbosacral region     No past medical history on file.  No past surgical history on file.  Social History     Tobacco Use    Smoking status: Every Day     Packs/day: 0.50     Years: 40.00     Additional pack years: 0.00     Total pack years: 20.00     Types: Cigarettes    Smokeless  "tobacco: Never   Substance Use Topics    Alcohol use: Yes     Comment: rarely     family history is not on file.    Current Outpatient Medications:     atorvastatin (Lipitor) 40 mg tablet, Take by mouth., Disp: , Rfl:     BD Ultra-Fine Mini Pen Needle 31 gauge x 3/16\" needle, , Disp: , Rfl:     cholecalciferol (Vitamin D-3) 50,000 unit capsule, Take 1 capsule (50,000 Units) by mouth every 30 (thirty) days., Disp: , Rfl:     fenofibrate micronized (Lofibra) 200 mg capsule, Take by mouth., Disp: , Rfl:     FreeStyle Stef 3 Sensor device, , Disp: , Rfl:     glimepiride (Amaryl) 4 mg tablet, Take 1 tablet (4 mg) by mouth once daily., Disp: , Rfl:     HumaLOG KwikPen Insulin 100 unit/mL injection, , Disp: , Rfl:     lisinopril 5 mg tablet, Take by mouth., Disp: , Rfl:     metFORMIN (Glucophage) 500 mg tablet, Take 1 tablet (500 mg) by mouth twice a day., Disp: , Rfl:     niacin 500 mg tablet, Take 1 tablet (500 mg) by mouth once daily., Disp: , Rfl:     nitroglycerin (Nitrostat) 0.4 mg SL tablet, DISSOLVE 1 TABLET UNDER TONGUE EVERY 5 MINUTES FOR 3 DOSES AS NEEDED THEN ONLY THEN CONTACT EMS, Disp: , Rfl:     omega-3 1,000 mg capsule capsule, Take 1 capsule (1,000 mg) by mouth twice a day., Disp: , Rfl:     Plavix 75 mg tablet, Take by mouth., Disp: , Rfl:     Semglee,insulin glarg-yfgn,Pen 100 unit/mL (3 mL) Pen, , Disp: , Rfl:     aspirin 81 mg chewable tablet, Chew 1 tablet (81 mg) once daily., Disp: , Rfl:     cyclobenzaprine (Flexeril) 10 mg tablet, Take 1 tablet (10 mg) by mouth as needed at bedtime., Disp: , Rfl:     diazePAM (Valium) 5 mg tablet, TAKE 1 TABLET BY MOUTH 30 MINUTES BEFORE MRI. MAY REPEAT AS NEEDED, Disp: , Rfl:     empagliflozin (Jardiance) 10 mg, Take 1 tablet (10 mg) by mouth once daily in the morning., Disp: , Rfl:     Jardiance 25 mg, , Disp: , Rfl:     METOPROLOL SUCCINATE ORAL, Take by mouth twice a day., Disp: , Rfl:     omega 3-dha-epa-fish oil 300 mg (120 mg- 180mg)-1,000 mg capsule, , " Disp: , Rfl:     OMEGA-3 ACID ETHYL ESTERS ORAL, Take by mouth., Disp: , Rfl:     oxyCODONE-acetaminophen (Percocet) 5-325 mg tablet, Take 1 tablet by mouth every 6 hours if needed., Disp: , Rfl:     sildenafil (Viagra) 100 mg tablet, , Disp: , Rfl:     SmoothLax 17 gram/dose powder, Take 17 g by mouth once daily as needed. FOR CONSTIPATION, Disp: , Rfl:     tadalafil (Cialis) 5 mg tablet, Take 1 tablet (5 mg) by mouth once daily., Disp: , Rfl:     tamsulosin (Flomax) 0.4 mg 24 hr capsule, Take 1 capsule (0.4 mg) by mouth once daily at bedtime. AT BEDTIME, Disp: , Rfl:     ticagrelor (Brilinta) 90 mg tablet, Take 1 tablet (90 mg) by mouth twice a day., Disp: , Rfl:   Allergies   Allergen Reactions    Gabapentin Headache and Dizziness       Physical Examination    General: Well developed, awake/alert/oriented x3, no distress, alert and cooperative  Skin: Warm and dry, no lesions, no rashes  ENMT: Mucous membranes moist, no apparent injury, no lesions seen  Head/Neck: Neck Supple, no apparent injury  Respiratory/Thorax: Normal breath sounds with good chest expansion, thorax symmetric  Cardiovascular: No pitting edema, no JVD    Motor Strength: 5/5 Throughout all extremities    Muscle Bulk: Normal and symmetric in all extremities    Posture:   -- Cervical: Normal  -- Thoracic: Normal  -- Lumbar : Normal  Paraspinal muscle spasm/tenderness absent.     Sensation: intact to light touch    L3 radiculopathy   Back pain    Results    I personally reviewed and interpreted the imaging results which included MRI severe spinal stenosis left sided disc herniation L2-3 causing nerve root recompression    Assessment and Plan:    Serafin Salgado is a 63 y.o. year old male who presents to the spine clinic with left leg and back pain in L3 distribution. He failed. I advised him to quit smoking and get his diabetes under control of 8. He needs clearance from his urologist for surgery to ensure he does not need any further treatment for his  prostate cancer.      I have personally reviewed and interpreted the imaging and detailed diagnosis with the patient, discussed the natural history of their disease and both non-operative and operative treatments available and rationale and the risks for all options.    The patient’s clinical symptoms correlates well with the radiological findings. Patient has been having significant functional impairment with decreased ability to perform her normal activities of daily living. They have tried treatment options including medications (NSAIDs/narcotics/muscle relaxants/membrane stabilizers), formal physical therapy, and injections.    I offered the option of surgery that would consist of a L2-L3 laminectomy discectomy.    I have explained the surgical procedure in detail with expected duration and extent of recovery along risks of surgery that include, but is not limited to bleeding, infection, blood vessel injury or damage, loss of sensation, loss of bladder, bowel or sexual function, nerve injury/damage resulting in weakness/paralysis, malunion, nonunion, CSF leak, brachial plexus injury, peripheral vision blindness, failure of implants/fusion, failure to relieve symptoms, recurrent disease, adjacent segment disease, need to reoperate for any reason and general anesthesia reaction such as stroke, coma, heart attack, delirium, confusion, death as well as worsening of preexisted medical conditions.    I clearly emphasized that while the goal of surgery is to decompress the spinal cord so as to ARREST the progression of neurological deficits - preexisting deficits may or may not improve after surgery. We discussed that many patients do clinically improve in functional and neurological outcomes following decompression of the spinal elements in patients with lumbar degenerative disease or radiculopathy the extent of which is variable and depends on the severity of pain, numbness, tingling, or weakness. With improvement  seen of those symptoms in that order. We did discuss the goal of surgery to alleviate pain first and foremost and hope for recovery of all neurologic function with time.    All questions were answered and the patient left satisfied with the surgical plan moving forward.    I have reviewed all prior documentation and reviewed the electronic medical record since admission. I have personally have reviewed all advanced imaging not just the reports and used my interpretation as documented as the relevant findings. I have reviewed the risks and benefits of all treatment recommendations listed in this note with the patient and family. I spent a total of 45 minutes in service to this patient's care during this date of service.      The above clinical summary has been dictated with voice recognition software. It has not been proofread for grammatical errors, typographical mistakes, or other semantic inconsistencies.    Thank you for visiting our office today. It was our pleasure to take part in your healthcare.     Do not hesitate to call with any questions regarding your plan of care after leaving at [phone] M-F 8am-4pm.     To clinicians, thank you very much for this kind referral. It is a privilege to partner with you in the care of your patients. My office would be delighted to assist you with any further consultations or with questions regarding the plan of care outlined. Do not hesitate to call the office or contact me directly.       Sincerely,      Jose Zapien MD  Director, [facility] [place]   of Neurosurgery, [facility] and [facility]  Complex Spine Fellowship Director  , Department of Neurological Surgery  [employer]    [facility]  [id number] [place] [address]  [address] [place] [address]  [address]  [place] [address]    Phone: [phone]  Fax: [phone]        Scribe Attestation  By signing my name below, I, Denise Vargas , Scribe   attest that this documentation has been prepared under the direction and in the presence of Jose Zapien MD.

## 2024-02-26 ENCOUNTER — LAB (OUTPATIENT)
Dept: LAB | Facility: LAB | Age: 64
End: 2024-02-26
Payer: COMMERCIAL

## 2024-02-26 DIAGNOSIS — M54.16 LUMBAR RADICULOPATHY: ICD-10-CM

## 2024-02-26 LAB
EST. AVERAGE GLUCOSE BLD GHB EST-MCNC: 186 MG/DL
HBA1C MFR BLD: 8.1 %

## 2024-02-26 PROCEDURE — 83036 HEMOGLOBIN GLYCOSYLATED A1C: CPT

## 2024-02-26 PROCEDURE — 36415 COLL VENOUS BLD VENIPUNCTURE: CPT

## 2024-03-15 ENCOUNTER — HOSPITAL ENCOUNTER (OUTPATIENT)
Dept: RADIATION ONCOLOGY | Facility: CLINIC | Age: 64
Setting detail: RADIATION/ONCOLOGY SERIES
Discharge: HOME | End: 2024-03-15
Payer: COMMERCIAL

## 2024-03-15 VITALS
OXYGEN SATURATION: 96 % | HEART RATE: 85 BPM | TEMPERATURE: 97.7 F | BODY MASS INDEX: 29.7 KG/M2 | DIASTOLIC BLOOD PRESSURE: 77 MMHG | SYSTOLIC BLOOD PRESSURE: 159 MMHG | WEIGHT: 219 LBS | RESPIRATION RATE: 18 BRPM

## 2024-03-15 DIAGNOSIS — C61 PROSTATE CANCER (MULTI): Primary | ICD-10-CM

## 2024-03-15 PROCEDURE — 99205 OFFICE O/P NEW HI 60 MIN: CPT | Performed by: STUDENT IN AN ORGANIZED HEALTH CARE EDUCATION/TRAINING PROGRAM

## 2024-03-15 PROCEDURE — 99417 PROLNG OP E/M EACH 15 MIN: CPT | Performed by: STUDENT IN AN ORGANIZED HEALTH CARE EDUCATION/TRAINING PROGRAM

## 2024-03-15 PROCEDURE — 99215 OFFICE O/P EST HI 40 MIN: CPT | Performed by: STUDENT IN AN ORGANIZED HEALTH CARE EDUCATION/TRAINING PROGRAM

## 2024-03-15 ASSESSMENT — COLUMBIA-SUICIDE SEVERITY RATING SCALE - C-SSRS
1. IN THE PAST MONTH, HAVE YOU WISHED YOU WERE DEAD OR WISHED YOU COULD GO TO SLEEP AND NOT WAKE UP?: NO
6. HAVE YOU EVER DONE ANYTHING, STARTED TO DO ANYTHING, OR PREPARED TO DO ANYTHING TO END YOUR LIFE?: NO
2. HAVE YOU ACTUALLY HAD ANY THOUGHTS OF KILLING YOURSELF?: NO

## 2024-03-15 ASSESSMENT — ENCOUNTER SYMPTOMS
ARTHRALGIAS: 1
CARDIOVASCULAR NEGATIVE: 1
EYES NEGATIVE: 1
BACK PAIN: 1
CONSTITUTIONAL NEGATIVE: 1
GASTROINTESTINAL NEGATIVE: 1
ENDOCRINE NEGATIVE: 1
RESPIRATORY NEGATIVE: 1
PSYCHIATRIC NEGATIVE: 1
HEMATOLOGIC/LYMPHATIC NEGATIVE: 1
NEUROLOGICAL NEGATIVE: 1

## 2024-03-15 ASSESSMENT — PATIENT HEALTH QUESTIONNAIRE - PHQ9
SUM OF ALL RESPONSES TO PHQ9 QUESTIONS 1 AND 2: 0
1. LITTLE INTEREST OR PLEASURE IN DOING THINGS: NOT AT ALL
2. FEELING DOWN, DEPRESSED OR HOPELESS: NOT AT ALL

## 2024-03-15 ASSESSMENT — PAIN SCALES - GENERAL: PAINLEVEL: 2

## 2024-03-15 NOTE — PROGRESS NOTES
Radiation Oncology Outpatient Consult    Patient Name:  Serafin Salgado  MRN:  [id number]  :  1960    Referring Provider: Adrien Peguero MD MPH  Primary Care Provider: No primary care provider on file.  Care Team: No care team member to display    Date of Service: 3/15/2024     SUBJECTIVE  History of Present Illness:  Serafin Salgado is a 63 y.o. male with rising PSA after prostatectomy for prostate cancer who is referred to me by Dr. Adrien Peguero for evaluation and management. iPSA was 21.74 ng/ml (2022). MRI prostate showed a T2 hypointense lesion in the left peripheral zone with concern for WILLIS. Prostate biopsies showed GG4 GS 4+4=8 adenocarcinoma. Bone scan was negative. On 3/16/23 was RP/BPLND with Dr. Peguero. Final pathology showed GG4 GS 4+4 adenocarcinoma, pT3aN0, +focal WILLIS, +PNI, +margins (right and left anterior). Post-op PSA was undetectable, and subsequently tisha to 0.18 on 24. Of note, the patient has lumbar stenosis and is planned for a laminectomy after treatment.  He states excellent urinary function after surgery, with no leakage, frequency, hematuria or dysuria.  He does not have erections firm enough for intercourse.  He states regular bowel habits without constipation or diarrhea.  He has no prior history of malignancy.    Prior Radiotherapy:  No    Current Systemic Treatment:  No     Presence of Pacemaker or ICD:  No    Past Medical History:    Past Medical History:   Diagnosis Date    CAD (coronary artery disease)     DM2 (diabetes mellitus, type 2) (CMS/HCC)     HLD (hyperlipidemia)     HTN (hypertension)     Osteoarthritis     Prostate cancer (CMS/HCC)         Past Surgical History:    Past Surgical History:   Procedure Laterality Date    CORONARY STENT PLACEMENT      PROSTATECTOMY          Family History:  Cancer-related family history includes Pancreatic cancer in his father.    Social History:    Social History     Tobacco Use    Smoking status: Every Day     Packs/day: 0.50      "Years: 40.00     Additional pack years: 0.00     Total pack years: 20.00     Types: Cigarettes    Smokeless tobacco: Never   Substance Use Topics    Alcohol use: Never     Comment: socially    Drug use: Not Currently       Allergies:    Allergies   Allergen Reactions    Gabapentin Headache and Dizziness        Medications:    Current Outpatient Medications:     atorvastatin (Lipitor) 40 mg tablet, Take by mouth., Disp: , Rfl:     cholecalciferol (Vitamin D-3) 50,000 unit capsule, Take 1 capsule (50,000 Units) by mouth every 30 (thirty) days., Disp: , Rfl:     fenofibrate micronized (Lofibra) 200 mg capsule, Take by mouth., Disp: , Rfl:     FreeStyle Stef 3 Sensor device, , Disp: , Rfl:     glimepiride (Amaryl) 4 mg tablet, Take 1 tablet (4 mg) by mouth once daily., Disp: , Rfl:     HumaLOG KwikPen Insulin 100 unit/mL injection, , Disp: , Rfl:     ibuprofen (IBU) 600 mg tablet, Take 1 tablet (600 mg) by mouth every 8 hours if needed for moderate pain (4 - 6)., Disp: 90 tablet, Rfl: 0    metFORMIN (Glucophage) 500 mg tablet, Take 1 tablet (500 mg) by mouth twice a day., Disp: , Rfl:     niacin 500 mg tablet, Take 1 tablet (500 mg) by mouth once daily., Disp: , Rfl:     nitroglycerin (Nitrostat) 0.4 mg SL tablet, DISSOLVE 1 TABLET UNDER TONGUE EVERY 5 MINUTES FOR 3 DOSES AS NEEDED THEN ONLY THEN CONTACT EMS, Disp: , Rfl:     omega 3-dha-epa-fish oil 300 mg (120 mg- 180mg)-1,000 mg capsule, , Disp: , Rfl:     Plavix 75 mg tablet, Take by mouth., Disp: , Rfl:     Semglee,insulin glarg-yfgn,Pen 100 unit/mL (3 mL) Pen, , Disp: , Rfl:     aspirin 81 mg chewable tablet, Chew 1 tablet (81 mg) once daily., Disp: , Rfl:     BD Ultra-Fine Mini Pen Needle 31 gauge x 3/16\" needle, , Disp: , Rfl:     cyclobenzaprine (Flexeril) 10 mg tablet, Take 1 tablet (10 mg) by mouth as needed at bedtime., Disp: , Rfl:     diazePAM (Valium) 5 mg tablet, TAKE 1 TABLET BY MOUTH 30 MINUTES BEFORE MRI. MAY REPEAT AS NEEDED, Disp: , Rfl:     " empagliflozin (Jardiance) 10 mg, Take 1 tablet (10 mg) by mouth once daily in the morning., Disp: , Rfl:     Jardiance 25 mg, , Disp: , Rfl:     lisinopril 5 mg tablet, Take by mouth., Disp: , Rfl:     METOPROLOL SUCCINATE ORAL, Take by mouth twice a day., Disp: , Rfl:     omega-3 1,000 mg capsule capsule, Take 1 capsule (1,000 mg) by mouth twice a day., Disp: , Rfl:     OMEGA-3 ACID ETHYL ESTERS ORAL, Take by mouth., Disp: , Rfl:     oxyCODONE-acetaminophen (Percocet) 5-325 mg tablet, Take 1 tablet by mouth every 6 hours if needed., Disp: , Rfl:     sildenafil (Viagra) 100 mg tablet, , Disp: , Rfl:     SmoothLax 17 gram/dose powder, Take 17 g by mouth once daily as needed. FOR CONSTIPATION, Disp: , Rfl:     tadalafil (Cialis) 5 mg tablet, Take 1 tablet (5 mg) by mouth once daily., Disp: , Rfl:     tamsulosin (Flomax) 0.4 mg 24 hr capsule, Take 1 capsule (0.4 mg) by mouth once daily at bedtime. AT BEDTIME, Disp: , Rfl:     ticagrelor (Brilinta) 90 mg tablet, Take 1 tablet (90 mg) by mouth twice a day., Disp: , Rfl:       Review of Systems:  Review of Systems   Constitutional: Negative.    HENT:  Negative.     Eyes: Negative.    Respiratory: Negative.     Cardiovascular: Negative.    Gastrointestinal: Negative.    Endocrine: Negative.    Genitourinary: Negative.     Musculoskeletal:  Positive for arthralgias and back pain.   Skin: Negative.    Neurological: Negative.    Hematological: Negative.    Psychiatric/Behavioral: Negative.       The patient's current pain level was assessed.  They report currently having a pain of 2 out of 10.  They feel their pain is under control without the use of pain medications.    Performance Status:  The Karnofsky performance scale today is 90, Able to carry on normal activity; minor signs or symptoms of disease (ECOG equivalent 0).        OBJECTIVE  Physical Exam:  /77   Pulse 85   Temp 36.5 °C (97.7 °F)   Resp 18   Wt 99.3 kg (219 lb)   SpO2 96%   BMI 29.70 kg/m²     Physical Exam  Vitals reviewed.   Constitutional:       General: He is not in acute distress.  HENT:      Head: Normocephalic and atraumatic.   Pulmonary:      Effort: Pulmonary effort is normal. No respiratory distress.   Neurological:      Mental Status: He is alert and oriented to person, place, and time.   Psychiatric:         Mood and Affect: Mood normal.         Behavior: Behavior normal.          Laboratory Review:    Lab Results   Component Value Date    PSA 0.18 01/24/2024    PSA <0.10 07/25/2023    PSA <0.10 04/27/2023         Pathology Review:  The pertinent pathology results were reviewed and discussed with the patient.     Imaging:  The pertinent imaging results were reviewed.       ASSESSMENT:  Serafin Salgado is a 63 y.o. male with rising PSA after prostatectomy for prostate cancer, initially GG4 iPSA 21.74 pT3aN0 +focal WILLIS, +PNI, +margins (right and left anterior), surgery 3/2023, post-op PSA undetectable with subsequent rise to 0.18 ng/ml 1/2024.     PLAN:   We discussed definitions of biochemical recurrence after surgery.  Biochemical recurrence is defined at a PSA level of 0.2 ng/ml, or on 2 successive rises in PSA.  Will repeat PSA now.  I suspect this PSA will cross the threshold for the definition of recurrence.  Prostate bed radiotherapy is recommended as salvage therapy.  I do not think restaging imaging with PSMA PET is necessary at this time given his PSA is so low. I would recommend prostate bed radiotherapy to 66 Gy in 33 fractions. Short-term side effects of prostate radiation therapy include but are not limited to  fatigue, bladder alterations such as increased urinary frequency and urgency, increased nocturia, dysuria, increase in urinary leakage, as well as rectal/bowel side effects including but not limited to increased bowel movement frequency, urgency, and loose bowel movements. We also discussed potential long-term complications of prostate radiotherapy including potential damage to  the bowel or bladder causing late bowel or bladder bleeding or other complication that could possibly require surgical intervention, a risk of erectile dysfunction, and rare risk of secondary malignancy.  He stated his understanding and signed informed consent for treatment today.    We also discussed the potential utility of ADT x6 months in his scenario. On one end, his current PSA is very low.  On the other hand, his surgical pathology did have adverse features including high Heartwell score, WILLIS, and positive margins.  We thoroughly discussed the risks and benefits associated with adding 6 months of ADT. He does have cardiovascular comorbidities. I recommended considering a Decipher score to help us decide the utility of ADT in his scenario.  He was agreeable with Decipher testing.  If this test shows a high risk result, I would recommend 6 months of ADT.  We discussed the use of Lupron shots versus Orgovyx pills, and he is more interested in Orgovyx pills if ADT is recommended.    I will call the patient with Decipher results once they are available, and then coordinate next steps in treatment planning.    NCCN Guidelines were applicable to guide this patients treatment plan.     Thank you for allowing me to participate in this patient's care.    Jluis Cooper MD  Department of Radiation Oncology  [facility]    Portions of this note were generated using voice recognition software, and may be subject to transcription errors.

## 2024-03-18 ENCOUNTER — LAB (OUTPATIENT)
Dept: LAB | Facility: LAB | Age: 64
End: 2024-03-18
Payer: COMMERCIAL

## 2024-03-18 DIAGNOSIS — C61 PROSTATE CANCER (MULTI): ICD-10-CM

## 2024-03-18 LAB — PSA SERPL-MCNC: 0.22 NG/ML

## 2024-03-18 PROCEDURE — 84153 ASSAY OF PSA TOTAL: CPT

## 2024-03-18 PROCEDURE — 36415 COLL VENOUS BLD VENIPUNCTURE: CPT

## 2024-04-01 ENCOUNTER — APPOINTMENT (OUTPATIENT)
Dept: HEMATOLOGY/ONCOLOGY | Facility: CLINIC | Age: 64
End: 2024-04-01
Payer: COMMERCIAL

## 2024-04-12 DIAGNOSIS — C61 PROSTATE CANCER (MULTI): Primary | ICD-10-CM

## 2024-04-12 NOTE — PROGRESS NOTES
Patient's decipher results are still pending.  Patient is eager to move ahead with salvage radiation to the prostate bed.  Will schedule for CT simulation for radiation planning, and will discuss decipher results with him when available to make a decision on whether or not we will add ADT to his treatment plan.    Thank you for allowing me to participate in this patient's care.    Jluis Cooper MD  Department of Radiation Oncology  [facility]    Portions of this note were generated using voice recognition software, and may be subject to transcription errors.

## 2024-04-15 ENCOUNTER — TELEPHONE (OUTPATIENT)
Dept: RADIATION ONCOLOGY | Facility: CLINIC | Age: 64
End: 2024-04-15
Payer: COMMERCIAL

## 2024-04-15 DIAGNOSIS — C61 PROSTATE CANCER (MULTI): Primary | ICD-10-CM

## 2024-04-15 NOTE — TELEPHONE ENCOUNTER
I called the patient to discuss his decipher genomic results. His decipher score is 0.48, intermediate risk. This score corresponds to an estimated 10-year metastasis risk of 5.6%, and 15 year PCSM risk of 5.9%. We discussed how the addition of ADT for 6 months to his radiotherapy course could further reduce these risks. However, given that his estimated metastasis risk is low, we decided to omit ADT from his salvage RT course. He is scheduled for radiation planning CT this week.    Thank you for allowing me to participate in this patient's care.    Jluis Cooper MD  Department of Radiation Oncology  [facility]    Portions of this note were generated using voice recognition software, and may be subject to transcription errors.

## 2024-04-17 ENCOUNTER — HOSPITAL ENCOUNTER (OUTPATIENT)
Dept: RADIOLOGY | Facility: CLINIC | Age: 64
Discharge: HOME | End: 2024-04-17
Payer: COMMERCIAL

## 2024-04-17 DIAGNOSIS — C61 PROSTATE CANCER (MULTI): ICD-10-CM

## 2024-04-22 ENCOUNTER — HOSPITAL ENCOUNTER (OUTPATIENT)
Dept: RADIOLOGY | Facility: CLINIC | Age: 64
Discharge: HOME | End: 2024-04-22
Payer: COMMERCIAL

## 2024-04-22 DIAGNOSIS — C61 PROSTATE CANCER (MULTI): ICD-10-CM

## 2024-04-22 PROCEDURE — 77263 THER RADIOLOGY TX PLNG CPLX: CPT | Performed by: STUDENT IN AN ORGANIZED HEALTH CARE EDUCATION/TRAINING PROGRAM

## 2024-04-26 ENCOUNTER — HOSPITAL ENCOUNTER (OUTPATIENT)
Dept: RADIATION ONCOLOGY | Facility: CLINIC | Age: 64
Setting detail: RADIATION/ONCOLOGY SERIES
Discharge: HOME | End: 2024-04-26
Payer: COMMERCIAL

## 2024-04-26 PROCEDURE — 77338 DESIGN MLC DEVICE FOR IMRT: CPT | Performed by: STUDENT IN AN ORGANIZED HEALTH CARE EDUCATION/TRAINING PROGRAM

## 2024-04-26 PROCEDURE — 77300 RADIATION THERAPY DOSE PLAN: CPT | Performed by: STUDENT IN AN ORGANIZED HEALTH CARE EDUCATION/TRAINING PROGRAM

## 2024-04-26 PROCEDURE — 77301 RADIOTHERAPY DOSE PLAN IMRT: CPT | Performed by: STUDENT IN AN ORGANIZED HEALTH CARE EDUCATION/TRAINING PROGRAM

## 2024-05-09 ENCOUNTER — RADIATION ONCOLOGY OTV (OUTPATIENT)
Dept: RADIATION ONCOLOGY | Facility: CLINIC | Age: 64
End: 2024-05-09
Payer: COMMERCIAL

## 2024-05-09 ENCOUNTER — HOSPITAL ENCOUNTER (OUTPATIENT)
Dept: RADIATION ONCOLOGY | Facility: CLINIC | Age: 64
Setting detail: RADIATION/ONCOLOGY SERIES
Discharge: HOME | End: 2024-05-09
Payer: COMMERCIAL

## 2024-05-09 VITALS
TEMPERATURE: 97.3 F | RESPIRATION RATE: 18 BRPM | OXYGEN SATURATION: 95 % | DIASTOLIC BLOOD PRESSURE: 82 MMHG | SYSTOLIC BLOOD PRESSURE: 158 MMHG | WEIGHT: 221.6 LBS | HEART RATE: 79 BPM | BODY MASS INDEX: 30.05 KG/M2

## 2024-05-09 DIAGNOSIS — C61 MALIGNANT NEOPLASM OF PROSTATE (MULTI): ICD-10-CM

## 2024-05-09 LAB
RAD ONC MSQ ACTUAL FRACTIONS DELIVERED: 1
RAD ONC MSQ ACTUAL SESSION DELIVERED DOSE: 200 CGRAY
RAD ONC MSQ ACTUAL TOTAL DOSE: 200 CGRAY
RAD ONC MSQ ELAPSED DAYS: 0
RAD ONC MSQ LAST DATE: NORMAL
RAD ONC MSQ PRESCRIBED FRACTIONAL DOSE: 200 CGRAY
RAD ONC MSQ PRESCRIBED NUMBER OF FRACTIONS: 33
RAD ONC MSQ PRESCRIBED TECHNIQUE: NORMAL
RAD ONC MSQ PRESCRIBED TOTAL DOSE: 6600 CGRAY
RAD ONC MSQ PRESCRIPTION PATTERN COMMENT: NORMAL
RAD ONC MSQ START DATE: NORMAL
RAD ONC MSQ TREATMENT COURSE NUMBER: 1
RAD ONC MSQ TREATMENT SITE: NORMAL

## 2024-05-09 PROCEDURE — 77427 RADIATION TX MANAGEMENT X5: CPT | Performed by: STUDENT IN AN ORGANIZED HEALTH CARE EDUCATION/TRAINING PROGRAM

## 2024-05-09 PROCEDURE — 77014 CHG CT GUIDANCE RADIATION THERAPY FLDS PLACEMENT: CPT | Performed by: STUDENT IN AN ORGANIZED HEALTH CARE EDUCATION/TRAINING PROGRAM

## 2024-05-09 PROCEDURE — 77385 HC INTENSITY-MODULATED RADIATION THERAPY (IMRT), SIMPLE: CPT | Performed by: STUDENT IN AN ORGANIZED HEALTH CARE EDUCATION/TRAINING PROGRAM

## 2024-05-09 ASSESSMENT — PAIN SCALES - GENERAL: PAINLEVEL: 0-NO PAIN

## 2024-05-09 NOTE — PROGRESS NOTES
Radiation Oncology On Treatment Visit    Patient Name:  Serafin Salgado  MRN:  [id number]  :  1960    Referring Provider: No ref. provider found  Primary Care Provider: No primary care provider on file.  Care Team: Patient Care Team:  ANDRESSA Guerrero-CNP as PCP - [facility] PCP    Date of Service: 2024     Diagnosis:   Specialty Problems          Radiation Oncology Problems    Prostate cancer (Multi)         Treatment Summary:  IMRT: Prostate bed    Treatment Period Technique Fraction Dose Fractions Total Dose   Course 1 2024-2024  (days elapsed: 0)         ProstBed 2024-2024 VMAT 200 / 200 cGy  200 / 6,600 cGy     SUBJECTIVE:   Tolerated first treatment today.  No new complaints.      OBJECTIVE:   Vital Signs:  /82   Pulse 79   Temp 36.3 °C (97.3 °F)   Resp 18   Wt 101 kg (221 lb 9.6 oz)   SpO2 95%   BMI 30.05 kg/m²    Pain Scale: The patient's current pain level was assessed.  They report currently having a pain of 0 out of 10.    Other Pertinent Findings:     Toxicity Assessment          2024    16:35   Toxicity Assessment   Adverse Events Reviewed (WDL) No (Exceptions to WDL)   Treatment Site Pelvis - male        Assessment / Plan:  The patient is tolerating radiation therapy as anticipated.  Continue per current treatment plan.     Discussed bowel and bladder prep.  Will restart senna S.    Thank you for allowing me to participate in this patient's care.    Jluis Cooper MD  Department of Radiation Oncology  [facility]    Portions of this note were generated using voice recognition software, and may be subject to transcription errors.

## 2024-05-10 ENCOUNTER — HOSPITAL ENCOUNTER (OUTPATIENT)
Dept: RADIATION ONCOLOGY | Facility: CLINIC | Age: 64
Setting detail: RADIATION/ONCOLOGY SERIES
Discharge: HOME | End: 2024-05-10
Payer: COMMERCIAL

## 2024-05-10 DIAGNOSIS — Z51.0 ENCOUNTER FOR ANTINEOPLASTIC RADIATION THERAPY: ICD-10-CM

## 2024-05-10 DIAGNOSIS — C61 MALIGNANT NEOPLASM OF PROSTATE (MULTI): ICD-10-CM

## 2024-05-10 LAB
RAD ONC MSQ ACTUAL FRACTIONS DELIVERED: 2
RAD ONC MSQ ACTUAL SESSION DELIVERED DOSE: 200 CGRAY
RAD ONC MSQ ACTUAL TOTAL DOSE: 400 CGRAY
RAD ONC MSQ ELAPSED DAYS: 1
RAD ONC MSQ LAST DATE: NORMAL
RAD ONC MSQ PRESCRIBED FRACTIONAL DOSE: 200 CGRAY
RAD ONC MSQ PRESCRIBED NUMBER OF FRACTIONS: 33
RAD ONC MSQ PRESCRIBED TECHNIQUE: NORMAL
RAD ONC MSQ PRESCRIBED TOTAL DOSE: 6600 CGRAY
RAD ONC MSQ PRESCRIPTION PATTERN COMMENT: NORMAL
RAD ONC MSQ START DATE: NORMAL
RAD ONC MSQ TREATMENT COURSE NUMBER: 1
RAD ONC MSQ TREATMENT SITE: NORMAL

## 2024-05-10 PROCEDURE — 77385 HC INTENSITY-MODULATED RADIATION THERAPY (IMRT), SIMPLE: CPT | Performed by: STUDENT IN AN ORGANIZED HEALTH CARE EDUCATION/TRAINING PROGRAM

## 2024-05-10 PROCEDURE — 77014 CHG CT GUIDANCE RADIATION THERAPY FLDS PLACEMENT: CPT | Performed by: STUDENT IN AN ORGANIZED HEALTH CARE EDUCATION/TRAINING PROGRAM

## 2024-05-13 ENCOUNTER — HOSPITAL ENCOUNTER (OUTPATIENT)
Dept: RADIATION ONCOLOGY | Facility: CLINIC | Age: 64
Setting detail: RADIATION/ONCOLOGY SERIES
Discharge: HOME | End: 2024-05-13
Payer: COMMERCIAL

## 2024-05-13 DIAGNOSIS — Z51.0 ENCOUNTER FOR ANTINEOPLASTIC RADIATION THERAPY: ICD-10-CM

## 2024-05-13 DIAGNOSIS — C61 MALIGNANT NEOPLASM OF PROSTATE (MULTI): ICD-10-CM

## 2024-05-13 LAB
RAD ONC MSQ ACTUAL FRACTIONS DELIVERED: 3
RAD ONC MSQ ACTUAL SESSION DELIVERED DOSE: 200 CGRAY
RAD ONC MSQ ACTUAL TOTAL DOSE: 600 CGRAY
RAD ONC MSQ ELAPSED DAYS: 4
RAD ONC MSQ LAST DATE: NORMAL
RAD ONC MSQ PRESCRIBED FRACTIONAL DOSE: 200 CGRAY
RAD ONC MSQ PRESCRIBED NUMBER OF FRACTIONS: 33
RAD ONC MSQ PRESCRIBED TECHNIQUE: NORMAL
RAD ONC MSQ PRESCRIBED TOTAL DOSE: 6600 CGRAY
RAD ONC MSQ PRESCRIPTION PATTERN COMMENT: NORMAL
RAD ONC MSQ START DATE: NORMAL
RAD ONC MSQ TREATMENT COURSE NUMBER: 1
RAD ONC MSQ TREATMENT SITE: NORMAL

## 2024-05-13 PROCEDURE — 77385 HC INTENSITY-MODULATED RADIATION THERAPY (IMRT), SIMPLE: CPT | Performed by: STUDENT IN AN ORGANIZED HEALTH CARE EDUCATION/TRAINING PROGRAM

## 2024-05-13 PROCEDURE — 77336 RADIATION PHYSICS CONSULT: CPT | Performed by: STUDENT IN AN ORGANIZED HEALTH CARE EDUCATION/TRAINING PROGRAM

## 2024-05-13 PROCEDURE — 77014 CHG CT GUIDANCE RADIATION THERAPY FLDS PLACEMENT: CPT | Performed by: STUDENT IN AN ORGANIZED HEALTH CARE EDUCATION/TRAINING PROGRAM

## 2024-05-14 ENCOUNTER — HOSPITAL ENCOUNTER (OUTPATIENT)
Dept: RADIATION ONCOLOGY | Facility: CLINIC | Age: 64
Setting detail: RADIATION/ONCOLOGY SERIES
Discharge: HOME | End: 2024-05-14
Payer: COMMERCIAL

## 2024-05-14 DIAGNOSIS — C61 MALIGNANT NEOPLASM OF PROSTATE (MULTI): ICD-10-CM

## 2024-05-14 DIAGNOSIS — Z51.0 ENCOUNTER FOR ANTINEOPLASTIC RADIATION THERAPY: ICD-10-CM

## 2024-05-14 LAB
RAD ONC MSQ ACTUAL FRACTIONS DELIVERED: 4
RAD ONC MSQ ACTUAL SESSION DELIVERED DOSE: 200 CGRAY
RAD ONC MSQ ACTUAL TOTAL DOSE: 800 CGRAY
RAD ONC MSQ ELAPSED DAYS: 5
RAD ONC MSQ LAST DATE: NORMAL
RAD ONC MSQ PRESCRIBED FRACTIONAL DOSE: 200 CGRAY
RAD ONC MSQ PRESCRIBED NUMBER OF FRACTIONS: 33
RAD ONC MSQ PRESCRIBED TECHNIQUE: NORMAL
RAD ONC MSQ PRESCRIBED TOTAL DOSE: 6600 CGRAY
RAD ONC MSQ PRESCRIPTION PATTERN COMMENT: NORMAL
RAD ONC MSQ START DATE: NORMAL
RAD ONC MSQ TREATMENT COURSE NUMBER: 1
RAD ONC MSQ TREATMENT SITE: NORMAL

## 2024-05-14 PROCEDURE — 77385 HC INTENSITY-MODULATED RADIATION THERAPY (IMRT), SIMPLE: CPT | Performed by: STUDENT IN AN ORGANIZED HEALTH CARE EDUCATION/TRAINING PROGRAM

## 2024-05-14 PROCEDURE — 77014 CHG CT GUIDANCE RADIATION THERAPY FLDS PLACEMENT: CPT | Performed by: STUDENT IN AN ORGANIZED HEALTH CARE EDUCATION/TRAINING PROGRAM

## 2024-05-15 ENCOUNTER — HOSPITAL ENCOUNTER (OUTPATIENT)
Dept: RADIATION ONCOLOGY | Facility: CLINIC | Age: 64
Setting detail: RADIATION/ONCOLOGY SERIES
Discharge: HOME | End: 2024-05-15
Payer: COMMERCIAL

## 2024-05-15 DIAGNOSIS — Z51.0 ENCOUNTER FOR ANTINEOPLASTIC RADIATION THERAPY: ICD-10-CM

## 2024-05-15 DIAGNOSIS — C61 MALIGNANT NEOPLASM OF PROSTATE (MULTI): ICD-10-CM

## 2024-05-15 LAB
RAD ONC MSQ ACTUAL FRACTIONS DELIVERED: 5
RAD ONC MSQ ACTUAL SESSION DELIVERED DOSE: 200 CGRAY
RAD ONC MSQ ACTUAL TOTAL DOSE: 1000 CGRAY
RAD ONC MSQ ELAPSED DAYS: 6
RAD ONC MSQ LAST DATE: NORMAL
RAD ONC MSQ PRESCRIBED FRACTIONAL DOSE: 200 CGRAY
RAD ONC MSQ PRESCRIBED NUMBER OF FRACTIONS: 33
RAD ONC MSQ PRESCRIBED TECHNIQUE: NORMAL
RAD ONC MSQ PRESCRIBED TOTAL DOSE: 6600 CGRAY
RAD ONC MSQ PRESCRIPTION PATTERN COMMENT: NORMAL
RAD ONC MSQ START DATE: NORMAL
RAD ONC MSQ TREATMENT COURSE NUMBER: 1
RAD ONC MSQ TREATMENT SITE: NORMAL

## 2024-05-15 PROCEDURE — 77014 CHG CT GUIDANCE RADIATION THERAPY FLDS PLACEMENT: CPT | Performed by: STUDENT IN AN ORGANIZED HEALTH CARE EDUCATION/TRAINING PROGRAM

## 2024-05-15 PROCEDURE — 77385 HC INTENSITY-MODULATED RADIATION THERAPY (IMRT), SIMPLE: CPT | Performed by: STUDENT IN AN ORGANIZED HEALTH CARE EDUCATION/TRAINING PROGRAM

## 2024-05-16 ENCOUNTER — HOSPITAL ENCOUNTER (OUTPATIENT)
Dept: RADIATION ONCOLOGY | Facility: CLINIC | Age: 64
Setting detail: RADIATION/ONCOLOGY SERIES
Discharge: HOME | End: 2024-05-16
Payer: COMMERCIAL

## 2024-05-16 ENCOUNTER — RADIATION ONCOLOGY OTV (OUTPATIENT)
Dept: RADIATION ONCOLOGY | Facility: CLINIC | Age: 64
End: 2024-05-16
Payer: COMMERCIAL

## 2024-05-16 VITALS
BODY MASS INDEX: 30 KG/M2 | SYSTOLIC BLOOD PRESSURE: 153 MMHG | RESPIRATION RATE: 18 BRPM | OXYGEN SATURATION: 96 % | WEIGHT: 221.2 LBS | HEART RATE: 83 BPM | TEMPERATURE: 97.7 F | DIASTOLIC BLOOD PRESSURE: 81 MMHG

## 2024-05-16 DIAGNOSIS — Z51.0 ENCOUNTER FOR ANTINEOPLASTIC RADIATION THERAPY: ICD-10-CM

## 2024-05-16 DIAGNOSIS — C61 MALIGNANT NEOPLASM OF PROSTATE (MULTI): ICD-10-CM

## 2024-05-16 LAB
RAD ONC MSQ ACTUAL FRACTIONS DELIVERED: 6
RAD ONC MSQ ACTUAL SESSION DELIVERED DOSE: 200 CGRAY
RAD ONC MSQ ACTUAL TOTAL DOSE: 1200 CGRAY
RAD ONC MSQ ELAPSED DAYS: 7
RAD ONC MSQ LAST DATE: NORMAL
RAD ONC MSQ PRESCRIBED FRACTIONAL DOSE: 200 CGRAY
RAD ONC MSQ PRESCRIBED NUMBER OF FRACTIONS: 33
RAD ONC MSQ PRESCRIBED TECHNIQUE: NORMAL
RAD ONC MSQ PRESCRIBED TOTAL DOSE: 6600 CGRAY
RAD ONC MSQ PRESCRIPTION PATTERN COMMENT: NORMAL
RAD ONC MSQ START DATE: NORMAL
RAD ONC MSQ TREATMENT COURSE NUMBER: 1
RAD ONC MSQ TREATMENT SITE: NORMAL

## 2024-05-16 PROCEDURE — 77427 RADIATION TX MANAGEMENT X5: CPT | Performed by: STUDENT IN AN ORGANIZED HEALTH CARE EDUCATION/TRAINING PROGRAM

## 2024-05-16 PROCEDURE — 77385 HC INTENSITY-MODULATED RADIATION THERAPY (IMRT), SIMPLE: CPT | Performed by: STUDENT IN AN ORGANIZED HEALTH CARE EDUCATION/TRAINING PROGRAM

## 2024-05-16 PROCEDURE — 77014 CHG CT GUIDANCE RADIATION THERAPY FLDS PLACEMENT: CPT | Performed by: STUDENT IN AN ORGANIZED HEALTH CARE EDUCATION/TRAINING PROGRAM

## 2024-05-16 ASSESSMENT — PAIN SCALES - GENERAL: PAINLEVEL: 0-NO PAIN

## 2024-05-16 NOTE — PROGRESS NOTES
Radiation Oncology On Treatment Visit    Patient Name:  Serafin Salgado  MRN:  [id number]  :  1960    Referring Provider: No ref. provider found  Primary Care Provider: No primary care provider on file.  Care Team: Patient Care Team:  ANDRESSA Guerrero-CNP as PCP - [facility] PCP    Date of Service: 2024     Diagnosis:   Specialty Problems          Radiation Oncology Problems    Prostate cancer (Multi)         Treatment Summary:  IMRT: Prostate bed    Treatment Period Technique Fraction Dose Fractions Total Dose   Course 1 2024-2024  (days elapsed: 7)         ProstBed 2024-2024 VMAT 200 / 200 cGy  1200 / 6,600 cGy     SUBJECTIVE: Patient doing well, denies fatigue, changes in bowel or bladder habits.      OBJECTIVE:   Vital Signs:  /81   Pulse 83   Temp 36.5 °C (97.7 °F)   Resp 18   Wt 100 kg (221 lb 3.2 oz)   SpO2 96%   BMI 30.00 kg/m²    Pain Scale: The patient's current pain level was assessed.  They report currently having a pain of 0 out of 10.    Other Pertinent Findings:     Toxicity Assessment          2024    16:35 2024    16:13   Toxicity Assessment   Adverse Events Reviewed (WDL) No (Exceptions to WDL) No (Exceptions to WDL)   Treatment Site Pelvis - male Pelvis - male   Fatigue  Grade 1        Assessment / Plan:  The patient is tolerating radiation therapy as anticipated.  Continue per current treatment plan.       Thank you for allowing me to participate in this patient's care.    Jluis Cooper MD  Department of Radiation Oncology  [facility]    Portions of this note were generated using voice recognition software, and may be subject to transcription errors.

## 2024-05-17 ENCOUNTER — HOSPITAL ENCOUNTER (OUTPATIENT)
Dept: RADIATION ONCOLOGY | Facility: CLINIC | Age: 64
Setting detail: RADIATION/ONCOLOGY SERIES
Discharge: HOME | End: 2024-05-17
Payer: COMMERCIAL

## 2024-05-17 ENCOUNTER — APPOINTMENT (OUTPATIENT)
Dept: RADIATION ONCOLOGY | Facility: CLINIC | Age: 64
End: 2024-05-17
Payer: COMMERCIAL

## 2024-05-17 DIAGNOSIS — C61 MALIGNANT NEOPLASM OF PROSTATE (MULTI): ICD-10-CM

## 2024-05-17 DIAGNOSIS — Z51.0 ENCOUNTER FOR ANTINEOPLASTIC RADIATION THERAPY: ICD-10-CM

## 2024-05-17 LAB
RAD ONC MSQ ACTUAL FRACTIONS DELIVERED: 7
RAD ONC MSQ ACTUAL SESSION DELIVERED DOSE: 200 CGRAY
RAD ONC MSQ ACTUAL TOTAL DOSE: 1400 CGRAY
RAD ONC MSQ ELAPSED DAYS: 8
RAD ONC MSQ LAST DATE: NORMAL
RAD ONC MSQ PRESCRIBED FRACTIONAL DOSE: 200 CGRAY
RAD ONC MSQ PRESCRIBED NUMBER OF FRACTIONS: 33
RAD ONC MSQ PRESCRIBED TECHNIQUE: NORMAL
RAD ONC MSQ PRESCRIBED TOTAL DOSE: 6600 CGRAY
RAD ONC MSQ PRESCRIPTION PATTERN COMMENT: NORMAL
RAD ONC MSQ START DATE: NORMAL
RAD ONC MSQ TREATMENT COURSE NUMBER: 1
RAD ONC MSQ TREATMENT SITE: NORMAL

## 2024-05-17 PROCEDURE — 77385 HC INTENSITY-MODULATED RADIATION THERAPY (IMRT), SIMPLE: CPT | Performed by: STUDENT IN AN ORGANIZED HEALTH CARE EDUCATION/TRAINING PROGRAM

## 2024-05-17 PROCEDURE — 77014 CHG CT GUIDANCE RADIATION THERAPY FLDS PLACEMENT: CPT | Performed by: STUDENT IN AN ORGANIZED HEALTH CARE EDUCATION/TRAINING PROGRAM

## 2024-05-20 ENCOUNTER — HOSPITAL ENCOUNTER (OUTPATIENT)
Dept: RADIATION ONCOLOGY | Facility: CLINIC | Age: 64
Setting detail: RADIATION/ONCOLOGY SERIES
Discharge: HOME | End: 2024-05-20
Payer: COMMERCIAL

## 2024-05-20 DIAGNOSIS — Z51.0 ENCOUNTER FOR ANTINEOPLASTIC RADIATION THERAPY: ICD-10-CM

## 2024-05-20 DIAGNOSIS — C61 MALIGNANT NEOPLASM OF PROSTATE (MULTI): ICD-10-CM

## 2024-05-20 LAB
RAD ONC MSQ ACTUAL FRACTIONS DELIVERED: 8
RAD ONC MSQ ACTUAL SESSION DELIVERED DOSE: 200 CGRAY
RAD ONC MSQ ACTUAL TOTAL DOSE: 1600 CGRAY
RAD ONC MSQ ELAPSED DAYS: 11
RAD ONC MSQ LAST DATE: NORMAL
RAD ONC MSQ PRESCRIBED FRACTIONAL DOSE: 200 CGRAY
RAD ONC MSQ PRESCRIBED NUMBER OF FRACTIONS: 33
RAD ONC MSQ PRESCRIBED TECHNIQUE: NORMAL
RAD ONC MSQ PRESCRIBED TOTAL DOSE: 6600 CGRAY
RAD ONC MSQ PRESCRIPTION PATTERN COMMENT: NORMAL
RAD ONC MSQ START DATE: NORMAL
RAD ONC MSQ TREATMENT COURSE NUMBER: 1
RAD ONC MSQ TREATMENT SITE: NORMAL

## 2024-05-20 PROCEDURE — 77014 CHG CT GUIDANCE RADIATION THERAPY FLDS PLACEMENT: CPT | Performed by: STUDENT IN AN ORGANIZED HEALTH CARE EDUCATION/TRAINING PROGRAM

## 2024-05-20 PROCEDURE — 77336 RADIATION PHYSICS CONSULT: CPT | Performed by: STUDENT IN AN ORGANIZED HEALTH CARE EDUCATION/TRAINING PROGRAM

## 2024-05-20 PROCEDURE — 77385 HC INTENSITY-MODULATED RADIATION THERAPY (IMRT), SIMPLE: CPT | Performed by: STUDENT IN AN ORGANIZED HEALTH CARE EDUCATION/TRAINING PROGRAM

## 2024-05-21 LAB
RAD ONC MSQ ACTUAL FRACTIONS DELIVERED: 9
RAD ONC MSQ ACTUAL SESSION DELIVERED DOSE: 200 CGRAY
RAD ONC MSQ ACTUAL TOTAL DOSE: 1800 CGRAY
RAD ONC MSQ ELAPSED DAYS: 12
RAD ONC MSQ LAST DATE: NORMAL
RAD ONC MSQ PRESCRIBED FRACTIONAL DOSE: 200 CGRAY
RAD ONC MSQ PRESCRIBED NUMBER OF FRACTIONS: 33
RAD ONC MSQ PRESCRIBED TECHNIQUE: NORMAL
RAD ONC MSQ PRESCRIBED TOTAL DOSE: 6600 CGRAY
RAD ONC MSQ PRESCRIPTION PATTERN COMMENT: NORMAL
RAD ONC MSQ START DATE: NORMAL
RAD ONC MSQ TREATMENT COURSE NUMBER: 1
RAD ONC MSQ TREATMENT SITE: NORMAL

## 2024-05-21 PROCEDURE — 77385 HC INTENSITY-MODULATED RADIATION THERAPY (IMRT), SIMPLE: CPT | Performed by: STUDENT IN AN ORGANIZED HEALTH CARE EDUCATION/TRAINING PROGRAM

## 2024-05-21 PROCEDURE — 77014 CHG CT GUIDANCE RADIATION THERAPY FLDS PLACEMENT: CPT | Performed by: STUDENT IN AN ORGANIZED HEALTH CARE EDUCATION/TRAINING PROGRAM

## 2024-05-22 ENCOUNTER — HOSPITAL ENCOUNTER (OUTPATIENT)
Dept: RADIATION ONCOLOGY | Facility: CLINIC | Age: 64
Setting detail: RADIATION/ONCOLOGY SERIES
Discharge: HOME | End: 2024-05-22
Payer: COMMERCIAL

## 2024-05-22 DIAGNOSIS — Z51.0 ENCOUNTER FOR ANTINEOPLASTIC RADIATION THERAPY: ICD-10-CM

## 2024-05-22 DIAGNOSIS — C61 MALIGNANT NEOPLASM OF PROSTATE (MULTI): ICD-10-CM

## 2024-05-22 LAB
RAD ONC MSQ ACTUAL FRACTIONS DELIVERED: 10
RAD ONC MSQ ACTUAL SESSION DELIVERED DOSE: 200 CGRAY
RAD ONC MSQ ACTUAL TOTAL DOSE: 2000 CGRAY
RAD ONC MSQ ELAPSED DAYS: 13
RAD ONC MSQ LAST DATE: NORMAL
RAD ONC MSQ PRESCRIBED FRACTIONAL DOSE: 200 CGRAY
RAD ONC MSQ PRESCRIBED NUMBER OF FRACTIONS: 33
RAD ONC MSQ PRESCRIBED TECHNIQUE: NORMAL
RAD ONC MSQ PRESCRIBED TOTAL DOSE: 6600 CGRAY
RAD ONC MSQ PRESCRIPTION PATTERN COMMENT: NORMAL
RAD ONC MSQ START DATE: NORMAL
RAD ONC MSQ TREATMENT COURSE NUMBER: 1
RAD ONC MSQ TREATMENT SITE: NORMAL

## 2024-05-22 PROCEDURE — 77014 CHG CT GUIDANCE RADIATION THERAPY FLDS PLACEMENT: CPT | Performed by: STUDENT IN AN ORGANIZED HEALTH CARE EDUCATION/TRAINING PROGRAM

## 2024-05-22 PROCEDURE — 77385 HC INTENSITY-MODULATED RADIATION THERAPY (IMRT), SIMPLE: CPT | Performed by: STUDENT IN AN ORGANIZED HEALTH CARE EDUCATION/TRAINING PROGRAM

## 2024-05-23 ENCOUNTER — HOSPITAL ENCOUNTER (OUTPATIENT)
Dept: RADIATION ONCOLOGY | Facility: CLINIC | Age: 64
Setting detail: RADIATION/ONCOLOGY SERIES
Discharge: HOME | End: 2024-05-23
Payer: COMMERCIAL

## 2024-05-23 ENCOUNTER — RADIATION ONCOLOGY OTV (OUTPATIENT)
Dept: RADIATION ONCOLOGY | Facility: CLINIC | Age: 64
End: 2024-05-23
Payer: COMMERCIAL

## 2024-05-23 VITALS
HEART RATE: 80 BPM | OXYGEN SATURATION: 94 % | BODY MASS INDEX: 29.65 KG/M2 | SYSTOLIC BLOOD PRESSURE: 149 MMHG | DIASTOLIC BLOOD PRESSURE: 77 MMHG | RESPIRATION RATE: 18 BRPM | TEMPERATURE: 97 F | WEIGHT: 218.6 LBS

## 2024-05-23 DIAGNOSIS — C61 MALIGNANT NEOPLASM OF PROSTATE (MULTI): ICD-10-CM

## 2024-05-23 DIAGNOSIS — Z51.0 ENCOUNTER FOR ANTINEOPLASTIC RADIATION THERAPY: ICD-10-CM

## 2024-05-23 LAB
RAD ONC MSQ ACTUAL FRACTIONS DELIVERED: 11
RAD ONC MSQ ACTUAL SESSION DELIVERED DOSE: 200 CGRAY
RAD ONC MSQ ACTUAL TOTAL DOSE: 2200 CGRAY
RAD ONC MSQ ELAPSED DAYS: 14
RAD ONC MSQ LAST DATE: NORMAL
RAD ONC MSQ PRESCRIBED FRACTIONAL DOSE: 200 CGRAY
RAD ONC MSQ PRESCRIBED NUMBER OF FRACTIONS: 33
RAD ONC MSQ PRESCRIBED TECHNIQUE: NORMAL
RAD ONC MSQ PRESCRIBED TOTAL DOSE: 6600 CGRAY
RAD ONC MSQ PRESCRIPTION PATTERN COMMENT: NORMAL
RAD ONC MSQ START DATE: NORMAL
RAD ONC MSQ TREATMENT COURSE NUMBER: 1
RAD ONC MSQ TREATMENT SITE: NORMAL

## 2024-05-23 PROCEDURE — 77427 RADIATION TX MANAGEMENT X5: CPT | Performed by: STUDENT IN AN ORGANIZED HEALTH CARE EDUCATION/TRAINING PROGRAM

## 2024-05-23 PROCEDURE — 77014 CHG CT GUIDANCE RADIATION THERAPY FLDS PLACEMENT: CPT | Performed by: STUDENT IN AN ORGANIZED HEALTH CARE EDUCATION/TRAINING PROGRAM

## 2024-05-23 PROCEDURE — 77385 HC INTENSITY-MODULATED RADIATION THERAPY (IMRT), SIMPLE: CPT | Performed by: STUDENT IN AN ORGANIZED HEALTH CARE EDUCATION/TRAINING PROGRAM

## 2024-05-23 ASSESSMENT — PAIN SCALES - GENERAL: PAINLEVEL: 0-NO PAIN

## 2024-05-23 NOTE — PROGRESS NOTES
Radiation Oncology On Treatment Visit    Patient Name:  Serafin Salgado  MRN:  [id number]  :  1960    Referring Provider: No ref. provider found  Primary Care Provider: No primary care provider on file.  Care Team: Patient Care Team:  ANDRESSA Guerrero-CNP as PCP - [facility] PCP    Date of Service: 2024     Diagnosis:   Specialty Problems          Radiation Oncology Problems    Prostate cancer (Multi)         Treatment Summary:  IMRT: Prostate bed    Treatment Period Technique Fraction Dose Fractions Total Dose   Course 1 2024-2024  (days elapsed: 14)         ProstBed 2024-2024 VMAT 200 / 200 cGy  2200 / 6,600 cGy     SUBJECTIVE: Doing well with mild fatigue.  No changes in urinary habits or bowel habits.  Denies pain.      OBJECTIVE:   Vital Signs:  /77   Pulse 80   Temp 36.1 °C (97 °F)   Resp 18   Wt 99.2 kg (218 lb 9.6 oz)   SpO2 94%   BMI 29.65 kg/m²    Pain Scale: The patient's current pain level was assessed.  They report currently having a pain of 0 out of 10.    Other Pertinent Findings:     Toxicity Assessment          2024    16:35 2024    16:13 2024    16:00   Toxicity Assessment   Adverse Events Reviewed (WDL) No (Exceptions to WDL) No (Exceptions to WDL) No (Exceptions to WDL)   Treatment Site Pelvis - male Pelvis - male Pelvis - male   Fatigue  Grade 1 Grade 1        Assessment / Plan:  The patient is tolerating radiation therapy as anticipated.  Continue per current treatment plan.       Thank you for allowing me to participate in this patient's care.    Jlusi Cooper MD  Department of Radiation Oncology  [facility]    Portions of this note were generated using voice recognition software, and may be subject to transcription errors.

## 2024-05-24 ENCOUNTER — HOSPITAL ENCOUNTER (OUTPATIENT)
Dept: RADIATION ONCOLOGY | Facility: CLINIC | Age: 64
Setting detail: RADIATION/ONCOLOGY SERIES
Discharge: HOME | End: 2024-05-24
Payer: COMMERCIAL

## 2024-05-24 DIAGNOSIS — C61 MALIGNANT NEOPLASM OF PROSTATE (MULTI): ICD-10-CM

## 2024-05-24 DIAGNOSIS — Z51.0 ENCOUNTER FOR ANTINEOPLASTIC RADIATION THERAPY: ICD-10-CM

## 2024-05-24 LAB
RAD ONC MSQ ACTUAL FRACTIONS DELIVERED: 12
RAD ONC MSQ ACTUAL SESSION DELIVERED DOSE: 200 CGRAY
RAD ONC MSQ ACTUAL TOTAL DOSE: 2400 CGRAY
RAD ONC MSQ ELAPSED DAYS: 15
RAD ONC MSQ LAST DATE: NORMAL
RAD ONC MSQ PRESCRIBED FRACTIONAL DOSE: 200 CGRAY
RAD ONC MSQ PRESCRIBED NUMBER OF FRACTIONS: 33
RAD ONC MSQ PRESCRIBED TECHNIQUE: NORMAL
RAD ONC MSQ PRESCRIBED TOTAL DOSE: 6600 CGRAY
RAD ONC MSQ PRESCRIPTION PATTERN COMMENT: NORMAL
RAD ONC MSQ START DATE: NORMAL
RAD ONC MSQ TREATMENT COURSE NUMBER: 1
RAD ONC MSQ TREATMENT SITE: NORMAL

## 2024-05-24 PROCEDURE — 77014 CHG CT GUIDANCE RADIATION THERAPY FLDS PLACEMENT: CPT | Performed by: STUDENT IN AN ORGANIZED HEALTH CARE EDUCATION/TRAINING PROGRAM

## 2024-05-24 PROCEDURE — 77385 HC INTENSITY-MODULATED RADIATION THERAPY (IMRT), SIMPLE: CPT | Performed by: STUDENT IN AN ORGANIZED HEALTH CARE EDUCATION/TRAINING PROGRAM

## 2024-05-28 ENCOUNTER — HOSPITAL ENCOUNTER (OUTPATIENT)
Dept: RADIATION ONCOLOGY | Facility: CLINIC | Age: 64
Setting detail: RADIATION/ONCOLOGY SERIES
Discharge: HOME | End: 2024-05-28
Payer: COMMERCIAL

## 2024-05-28 DIAGNOSIS — C61 MALIGNANT NEOPLASM OF PROSTATE (MULTI): ICD-10-CM

## 2024-05-28 DIAGNOSIS — Z51.0 ENCOUNTER FOR ANTINEOPLASTIC RADIATION THERAPY: ICD-10-CM

## 2024-05-28 LAB
RAD ONC MSQ ACTUAL FRACTIONS DELIVERED: 13
RAD ONC MSQ ACTUAL SESSION DELIVERED DOSE: 200 CGRAY
RAD ONC MSQ ACTUAL TOTAL DOSE: 2600 CGRAY
RAD ONC MSQ ELAPSED DAYS: 19
RAD ONC MSQ LAST DATE: NORMAL
RAD ONC MSQ PRESCRIBED FRACTIONAL DOSE: 200 CGRAY
RAD ONC MSQ PRESCRIBED NUMBER OF FRACTIONS: 33
RAD ONC MSQ PRESCRIBED TECHNIQUE: NORMAL
RAD ONC MSQ PRESCRIBED TOTAL DOSE: 6600 CGRAY
RAD ONC MSQ PRESCRIPTION PATTERN COMMENT: NORMAL
RAD ONC MSQ START DATE: NORMAL
RAD ONC MSQ TREATMENT COURSE NUMBER: 1
RAD ONC MSQ TREATMENT SITE: NORMAL

## 2024-05-28 PROCEDURE — 77385 HC INTENSITY-MODULATED RADIATION THERAPY (IMRT), SIMPLE: CPT | Performed by: STUDENT IN AN ORGANIZED HEALTH CARE EDUCATION/TRAINING PROGRAM

## 2024-05-28 PROCEDURE — 77014 CHG CT GUIDANCE RADIATION THERAPY FLDS PLACEMENT: CPT | Performed by: RADIOLOGY

## 2024-05-29 ENCOUNTER — HOSPITAL ENCOUNTER (OUTPATIENT)
Dept: RADIATION ONCOLOGY | Facility: CLINIC | Age: 64
Setting detail: RADIATION/ONCOLOGY SERIES
Discharge: HOME | End: 2024-05-29
Payer: COMMERCIAL

## 2024-05-29 DIAGNOSIS — Z51.0 ENCOUNTER FOR ANTINEOPLASTIC RADIATION THERAPY: ICD-10-CM

## 2024-05-29 DIAGNOSIS — C61 MALIGNANT NEOPLASM OF PROSTATE (MULTI): ICD-10-CM

## 2024-05-29 LAB
RAD ONC MSQ ACTUAL FRACTIONS DELIVERED: 14
RAD ONC MSQ ACTUAL SESSION DELIVERED DOSE: 200 CGRAY
RAD ONC MSQ ACTUAL TOTAL DOSE: 2800 CGRAY
RAD ONC MSQ ELAPSED DAYS: 20
RAD ONC MSQ LAST DATE: NORMAL
RAD ONC MSQ PRESCRIBED FRACTIONAL DOSE: 200 CGRAY
RAD ONC MSQ PRESCRIBED NUMBER OF FRACTIONS: 33
RAD ONC MSQ PRESCRIBED TECHNIQUE: NORMAL
RAD ONC MSQ PRESCRIBED TOTAL DOSE: 6600 CGRAY
RAD ONC MSQ PRESCRIPTION PATTERN COMMENT: NORMAL
RAD ONC MSQ START DATE: NORMAL
RAD ONC MSQ TREATMENT COURSE NUMBER: 1
RAD ONC MSQ TREATMENT SITE: NORMAL

## 2024-05-29 PROCEDURE — 77014 CHG CT GUIDANCE RADIATION THERAPY FLDS PLACEMENT: CPT | Performed by: STUDENT IN AN ORGANIZED HEALTH CARE EDUCATION/TRAINING PROGRAM

## 2024-05-29 PROCEDURE — 77385 HC INTENSITY-MODULATED RADIATION THERAPY (IMRT), SIMPLE: CPT | Performed by: STUDENT IN AN ORGANIZED HEALTH CARE EDUCATION/TRAINING PROGRAM

## 2024-05-30 ENCOUNTER — HOSPITAL ENCOUNTER (OUTPATIENT)
Dept: RADIATION ONCOLOGY | Facility: CLINIC | Age: 64
Setting detail: RADIATION/ONCOLOGY SERIES
Discharge: HOME | End: 2024-05-30
Payer: COMMERCIAL

## 2024-05-30 ENCOUNTER — RADIATION ONCOLOGY OTV (OUTPATIENT)
Dept: RADIATION ONCOLOGY | Facility: CLINIC | Age: 64
End: 2024-05-30
Payer: COMMERCIAL

## 2024-05-30 VITALS
BODY MASS INDEX: 29.7 KG/M2 | OXYGEN SATURATION: 96 % | RESPIRATION RATE: 18 BRPM | HEART RATE: 79 BPM | SYSTOLIC BLOOD PRESSURE: 160 MMHG | WEIGHT: 219 LBS | DIASTOLIC BLOOD PRESSURE: 80 MMHG | TEMPERATURE: 96.8 F

## 2024-05-30 DIAGNOSIS — C61 MALIGNANT NEOPLASM OF PROSTATE (MULTI): ICD-10-CM

## 2024-05-30 DIAGNOSIS — Z51.0 ENCOUNTER FOR ANTINEOPLASTIC RADIATION THERAPY: ICD-10-CM

## 2024-05-30 LAB
RAD ONC MSQ ACTUAL FRACTIONS DELIVERED: 15
RAD ONC MSQ ACTUAL SESSION DELIVERED DOSE: 200 CGRAY
RAD ONC MSQ ACTUAL TOTAL DOSE: 3000 CGRAY
RAD ONC MSQ ELAPSED DAYS: 21
RAD ONC MSQ LAST DATE: NORMAL
RAD ONC MSQ PRESCRIBED FRACTIONAL DOSE: 200 CGRAY
RAD ONC MSQ PRESCRIBED NUMBER OF FRACTIONS: 33
RAD ONC MSQ PRESCRIBED TECHNIQUE: NORMAL
RAD ONC MSQ PRESCRIBED TOTAL DOSE: 6600 CGRAY
RAD ONC MSQ PRESCRIPTION PATTERN COMMENT: NORMAL
RAD ONC MSQ START DATE: NORMAL
RAD ONC MSQ TREATMENT COURSE NUMBER: 1
RAD ONC MSQ TREATMENT SITE: NORMAL

## 2024-05-30 PROCEDURE — 77014 CHG CT GUIDANCE RADIATION THERAPY FLDS PLACEMENT: CPT | Performed by: STUDENT IN AN ORGANIZED HEALTH CARE EDUCATION/TRAINING PROGRAM

## 2024-05-30 PROCEDURE — 77336 RADIATION PHYSICS CONSULT: CPT | Performed by: STUDENT IN AN ORGANIZED HEALTH CARE EDUCATION/TRAINING PROGRAM

## 2024-05-30 PROCEDURE — 77385 HC INTENSITY-MODULATED RADIATION THERAPY (IMRT), SIMPLE: CPT | Performed by: STUDENT IN AN ORGANIZED HEALTH CARE EDUCATION/TRAINING PROGRAM

## 2024-05-30 ASSESSMENT — PAIN SCALES - GENERAL: PAINLEVEL: 0-NO PAIN

## 2024-05-30 NOTE — PROGRESS NOTES
Radiation Oncology On Treatment Visit    Patient Name:  Serafin Salgado  MRN:  [id number]  :  1960    Referring Provider: No ref. provider found  Primary Care Provider: No primary care provider on file.  Care Team: Patient Care Team:  ANDRESSA Guerrero-CNP as PCP - [facility] PCP    Date of Service: 2024     Diagnosis:   Specialty Problems          Radiation Oncology Problems    Prostate cancer (Multi)         Treatment Summary:  IMRT: Prostate bed    Treatment Period Technique Fraction Dose Fractions Total Dose   Course 1 2024-2024  (days elapsed: 21)         ProstBed 2024-2024 VMAT 200 / 200 cGy 15 / 33 3000 / 6,600 cGy     SUBJECTIVE:   Patient endorses mild fatigue.  No changes in urinary or bowel habits.      OBJECTIVE:   Vital Signs:  /80   Pulse 79   Temp 36 °C (96.8 °F)   Resp 18   Wt 99.3 kg (219 lb)   SpO2 96%   BMI 29.70 kg/m²    Pain Scale: The patient's current pain level was assessed.  They report currently having a pain of 0 out of 10.    Other Pertinent Findings:     Toxicity Assessment          2024    16:35 2024    16:13 2024    16:00 2024    16:07   Toxicity Assessment   Adverse Events Reviewed (WDL) No (Exceptions to WDL) No (Exceptions to WDL) No (Exceptions to WDL) No (Exceptions to WDL)   Treatment Site Pelvis - male Pelvis - male Pelvis - male Pelvis - male   Fatigue  Grade 1 Grade 1 Grade 1        Assessment / Plan:  The patient is tolerating radiation therapy as anticipated.  Continue per current treatment plan.       Thank you for allowing me to participate in this patient's care.    Jluis Cooper MD  Department of Radiation Oncology  [facility]    Portions of this note were generated using voice recognition software, and may be subject to transcription errors.

## 2024-05-31 ENCOUNTER — HOSPITAL ENCOUNTER (OUTPATIENT)
Dept: RADIATION ONCOLOGY | Facility: CLINIC | Age: 64
Setting detail: RADIATION/ONCOLOGY SERIES
Discharge: HOME | End: 2024-05-31
Payer: COMMERCIAL

## 2024-05-31 DIAGNOSIS — Z51.0 ENCOUNTER FOR ANTINEOPLASTIC RADIATION THERAPY: ICD-10-CM

## 2024-05-31 DIAGNOSIS — C61 MALIGNANT NEOPLASM OF PROSTATE (MULTI): ICD-10-CM

## 2024-05-31 LAB
RAD ONC MSQ ACTUAL FRACTIONS DELIVERED: 16
RAD ONC MSQ ACTUAL SESSION DELIVERED DOSE: 200 CGRAY
RAD ONC MSQ ACTUAL TOTAL DOSE: 3200 CGRAY
RAD ONC MSQ ELAPSED DAYS: 22
RAD ONC MSQ LAST DATE: NORMAL
RAD ONC MSQ PRESCRIBED FRACTIONAL DOSE: 200 CGRAY
RAD ONC MSQ PRESCRIBED NUMBER OF FRACTIONS: 33
RAD ONC MSQ PRESCRIBED TECHNIQUE: NORMAL
RAD ONC MSQ PRESCRIBED TOTAL DOSE: 6600 CGRAY
RAD ONC MSQ PRESCRIPTION PATTERN COMMENT: NORMAL
RAD ONC MSQ START DATE: NORMAL
RAD ONC MSQ TREATMENT COURSE NUMBER: 1
RAD ONC MSQ TREATMENT SITE: NORMAL

## 2024-05-31 PROCEDURE — 77014 CHG CT GUIDANCE RADIATION THERAPY FLDS PLACEMENT: CPT | Performed by: STUDENT IN AN ORGANIZED HEALTH CARE EDUCATION/TRAINING PROGRAM

## 2024-05-31 PROCEDURE — 77385 HC INTENSITY-MODULATED RADIATION THERAPY (IMRT), SIMPLE: CPT | Performed by: STUDENT IN AN ORGANIZED HEALTH CARE EDUCATION/TRAINING PROGRAM

## 2024-06-03 ENCOUNTER — HOSPITAL ENCOUNTER (OUTPATIENT)
Dept: RADIATION ONCOLOGY | Facility: CLINIC | Age: 64
Setting detail: RADIATION/ONCOLOGY SERIES
Discharge: HOME | End: 2024-06-03
Payer: COMMERCIAL

## 2024-06-03 DIAGNOSIS — C61 MALIGNANT NEOPLASM OF PROSTATE (MULTI): ICD-10-CM

## 2024-06-03 DIAGNOSIS — Z51.0 ENCOUNTER FOR ANTINEOPLASTIC RADIATION THERAPY: ICD-10-CM

## 2024-06-03 LAB
RAD ONC MSQ ACTUAL FRACTIONS DELIVERED: 17
RAD ONC MSQ ACTUAL SESSION DELIVERED DOSE: 200 CGRAY
RAD ONC MSQ ACTUAL TOTAL DOSE: 3400 CGRAY
RAD ONC MSQ ELAPSED DAYS: 25
RAD ONC MSQ LAST DATE: NORMAL
RAD ONC MSQ PRESCRIBED FRACTIONAL DOSE: 200 CGRAY
RAD ONC MSQ PRESCRIBED NUMBER OF FRACTIONS: 33
RAD ONC MSQ PRESCRIBED TECHNIQUE: NORMAL
RAD ONC MSQ PRESCRIBED TOTAL DOSE: 6600 CGRAY
RAD ONC MSQ PRESCRIPTION PATTERN COMMENT: NORMAL
RAD ONC MSQ START DATE: NORMAL
RAD ONC MSQ TREATMENT COURSE NUMBER: 1
RAD ONC MSQ TREATMENT SITE: NORMAL

## 2024-06-03 PROCEDURE — 77385 HC INTENSITY-MODULATED RADIATION THERAPY (IMRT), SIMPLE: CPT | Performed by: STUDENT IN AN ORGANIZED HEALTH CARE EDUCATION/TRAINING PROGRAM

## 2024-06-03 PROCEDURE — 77336 RADIATION PHYSICS CONSULT: CPT | Performed by: STUDENT IN AN ORGANIZED HEALTH CARE EDUCATION/TRAINING PROGRAM

## 2024-06-04 ENCOUNTER — HOSPITAL ENCOUNTER (OUTPATIENT)
Dept: RADIATION ONCOLOGY | Facility: CLINIC | Age: 64
Setting detail: RADIATION/ONCOLOGY SERIES
Discharge: HOME | End: 2024-06-04
Payer: COMMERCIAL

## 2024-06-04 DIAGNOSIS — C61 MALIGNANT NEOPLASM OF PROSTATE (MULTI): ICD-10-CM

## 2024-06-04 DIAGNOSIS — Z51.0 ENCOUNTER FOR ANTINEOPLASTIC RADIATION THERAPY: ICD-10-CM

## 2024-06-04 LAB
RAD ONC MSQ ACTUAL FRACTIONS DELIVERED: 18
RAD ONC MSQ ACTUAL SESSION DELIVERED DOSE: 200 CGRAY
RAD ONC MSQ ACTUAL TOTAL DOSE: 3600 CGRAY
RAD ONC MSQ ELAPSED DAYS: 26
RAD ONC MSQ LAST DATE: NORMAL
RAD ONC MSQ PRESCRIBED FRACTIONAL DOSE: 200 CGRAY
RAD ONC MSQ PRESCRIBED NUMBER OF FRACTIONS: 33
RAD ONC MSQ PRESCRIBED TECHNIQUE: NORMAL
RAD ONC MSQ PRESCRIBED TOTAL DOSE: 6600 CGRAY
RAD ONC MSQ PRESCRIPTION PATTERN COMMENT: NORMAL
RAD ONC MSQ START DATE: NORMAL
RAD ONC MSQ TREATMENT COURSE NUMBER: 1
RAD ONC MSQ TREATMENT SITE: NORMAL

## 2024-06-04 PROCEDURE — 77385 HC INTENSITY-MODULATED RADIATION THERAPY (IMRT), SIMPLE: CPT | Performed by: STUDENT IN AN ORGANIZED HEALTH CARE EDUCATION/TRAINING PROGRAM

## 2024-06-05 ENCOUNTER — HOSPITAL ENCOUNTER (OUTPATIENT)
Dept: RADIATION ONCOLOGY | Facility: CLINIC | Age: 64
Setting detail: RADIATION/ONCOLOGY SERIES
Discharge: HOME | End: 2024-06-05
Payer: COMMERCIAL

## 2024-06-05 DIAGNOSIS — C61 MALIGNANT NEOPLASM OF PROSTATE (MULTI): ICD-10-CM

## 2024-06-05 DIAGNOSIS — Z51.0 ENCOUNTER FOR ANTINEOPLASTIC RADIATION THERAPY: ICD-10-CM

## 2024-06-05 LAB
RAD ONC MSQ ACTUAL FRACTIONS DELIVERED: 19
RAD ONC MSQ ACTUAL SESSION DELIVERED DOSE: 200 CGRAY
RAD ONC MSQ ACTUAL TOTAL DOSE: 3800 CGRAY
RAD ONC MSQ ELAPSED DAYS: 27
RAD ONC MSQ LAST DATE: NORMAL
RAD ONC MSQ PRESCRIBED FRACTIONAL DOSE: 200 CGRAY
RAD ONC MSQ PRESCRIBED NUMBER OF FRACTIONS: 33
RAD ONC MSQ PRESCRIBED TECHNIQUE: NORMAL
RAD ONC MSQ PRESCRIBED TOTAL DOSE: 6600 CGRAY
RAD ONC MSQ PRESCRIPTION PATTERN COMMENT: NORMAL
RAD ONC MSQ START DATE: NORMAL
RAD ONC MSQ TREATMENT COURSE NUMBER: 1
RAD ONC MSQ TREATMENT SITE: NORMAL

## 2024-06-05 PROCEDURE — 77385 HC INTENSITY-MODULATED RADIATION THERAPY (IMRT), SIMPLE: CPT | Performed by: STUDENT IN AN ORGANIZED HEALTH CARE EDUCATION/TRAINING PROGRAM

## 2024-06-06 ENCOUNTER — RADIATION ONCOLOGY OTV (OUTPATIENT)
Dept: RADIATION ONCOLOGY | Facility: CLINIC | Age: 64
End: 2024-06-06
Payer: COMMERCIAL

## 2024-06-06 ENCOUNTER — HOSPITAL ENCOUNTER (OUTPATIENT)
Dept: RADIATION ONCOLOGY | Facility: CLINIC | Age: 64
Setting detail: RADIATION/ONCOLOGY SERIES
Discharge: HOME | End: 2024-06-06
Payer: COMMERCIAL

## 2024-06-06 VITALS
HEART RATE: 90 BPM | OXYGEN SATURATION: 96 % | RESPIRATION RATE: 18 BRPM | BODY MASS INDEX: 29.21 KG/M2 | DIASTOLIC BLOOD PRESSURE: 75 MMHG | SYSTOLIC BLOOD PRESSURE: 140 MMHG | TEMPERATURE: 97.3 F | WEIGHT: 215.4 LBS

## 2024-06-06 DIAGNOSIS — Z51.0 ENCOUNTER FOR ANTINEOPLASTIC RADIATION THERAPY: ICD-10-CM

## 2024-06-06 DIAGNOSIS — C61 MALIGNANT NEOPLASM OF PROSTATE (MULTI): ICD-10-CM

## 2024-06-06 LAB
RAD ONC MSQ ACTUAL FRACTIONS DELIVERED: 20
RAD ONC MSQ ACTUAL SESSION DELIVERED DOSE: 200 CGRAY
RAD ONC MSQ ACTUAL TOTAL DOSE: 4000 CGRAY
RAD ONC MSQ ELAPSED DAYS: 28
RAD ONC MSQ LAST DATE: NORMAL
RAD ONC MSQ PRESCRIBED FRACTIONAL DOSE: 200 CGRAY
RAD ONC MSQ PRESCRIBED NUMBER OF FRACTIONS: 33
RAD ONC MSQ PRESCRIBED TECHNIQUE: NORMAL
RAD ONC MSQ PRESCRIBED TOTAL DOSE: 6600 CGRAY
RAD ONC MSQ PRESCRIPTION PATTERN COMMENT: NORMAL
RAD ONC MSQ START DATE: NORMAL
RAD ONC MSQ TREATMENT COURSE NUMBER: 1
RAD ONC MSQ TREATMENT SITE: NORMAL

## 2024-06-06 PROCEDURE — 77385 HC INTENSITY-MODULATED RADIATION THERAPY (IMRT), SIMPLE: CPT | Performed by: STUDENT IN AN ORGANIZED HEALTH CARE EDUCATION/TRAINING PROGRAM

## 2024-06-06 ASSESSMENT — PAIN SCALES - GENERAL: PAINLEVEL: 0-NO PAIN

## 2024-06-06 NOTE — PROGRESS NOTES
Radiation Oncology On Treatment Visit    Patient Name:  Serafin Salgado  MRN:  [id number]  :  1960    Referring Provider: No ref. provider found  Primary Care Provider: No primary care provider on file.  Care Team: Patient Care Team:  TREE Guerrero as PCP - [facility] PCP    Date of Service: 2024     Diagnosis:   Specialty Problems          Radiation Oncology Problems    Prostate cancer (Multi)         Treatment Summary:  IMRT: Prostate bed    Treatment Period Technique Fraction Dose Fractions Total Dose   Course 1 2024-2024  (days elapsed: 28)         ProstBed 2024-2024 VMAT 200 / 200 cGy  4000 / 6,600 cGy     SUBJECTIVE: Patient tolerating treatment, no new or worsening urinary or bowel symptoms      OBJECTIVE:   Vital Signs:  /75   Pulse 90   Temp 36.3 °C (97.3 °F)   Resp 18   Wt 97.7 kg (215 lb 6.4 oz)   SpO2 96%   BMI 29.21 kg/m²    Pain Scale: The patient's current pain level was assessed.  They report currently having a pain of 0 out of 10.    Other Pertinent Findings:     Toxicity Assessment          2024    16:35 2024    16:13 2024    16:00 2024    16:07 2024    15:52   Toxicity Assessment   Adverse Events Reviewed (WDL) No (Exceptions to WDL) No (Exceptions to WDL) No (Exceptions to WDL) No (Exceptions to WDL) No (Exceptions to WDL)   Treatment Site Pelvis - male Pelvis - male Pelvis - male Pelvis - male Pelvis - male   Diarrhea     Grade 0   Fatigue  Grade 1 Grade 1 Grade 1 Grade 0        Assessment / Plan:  The patient is tolerating radiation therapy as anticipated.  Continue per current treatment plan.       Thank you for allowing me to participate in this patient's care.    Jluis Cooper MD  Department of Radiation Oncology  [facility]    Portions of this note were generated using voice recognition software, and may be subject to transcription errors.

## 2024-06-07 ENCOUNTER — HOSPITAL ENCOUNTER (OUTPATIENT)
Dept: RADIATION ONCOLOGY | Facility: CLINIC | Age: 64
Setting detail: RADIATION/ONCOLOGY SERIES
Discharge: HOME | End: 2024-06-07
Payer: COMMERCIAL

## 2024-06-07 DIAGNOSIS — Z51.0 ENCOUNTER FOR ANTINEOPLASTIC RADIATION THERAPY: ICD-10-CM

## 2024-06-07 DIAGNOSIS — C61 MALIGNANT NEOPLASM OF PROSTATE (MULTI): ICD-10-CM

## 2024-06-07 LAB
RAD ONC MSQ ACTUAL FRACTIONS DELIVERED: 21
RAD ONC MSQ ACTUAL SESSION DELIVERED DOSE: 200 CGRAY
RAD ONC MSQ ACTUAL TOTAL DOSE: 4200 CGRAY
RAD ONC MSQ ELAPSED DAYS: 29
RAD ONC MSQ LAST DATE: NORMAL
RAD ONC MSQ PRESCRIBED FRACTIONAL DOSE: 200 CGRAY
RAD ONC MSQ PRESCRIBED NUMBER OF FRACTIONS: 33
RAD ONC MSQ PRESCRIBED TECHNIQUE: NORMAL
RAD ONC MSQ PRESCRIBED TOTAL DOSE: 6600 CGRAY
RAD ONC MSQ PRESCRIPTION PATTERN COMMENT: NORMAL
RAD ONC MSQ START DATE: NORMAL
RAD ONC MSQ TREATMENT COURSE NUMBER: 1
RAD ONC MSQ TREATMENT SITE: NORMAL

## 2024-06-07 PROCEDURE — 77385 HC INTENSITY-MODULATED RADIATION THERAPY (IMRT), SIMPLE: CPT | Performed by: STUDENT IN AN ORGANIZED HEALTH CARE EDUCATION/TRAINING PROGRAM

## 2024-06-10 ENCOUNTER — LAB (OUTPATIENT)
Dept: LAB | Facility: LAB | Age: 64
End: 2024-06-10
Payer: COMMERCIAL

## 2024-06-10 DIAGNOSIS — E78.5 HYPERLIPIDEMIA, UNSPECIFIED: Primary | ICD-10-CM

## 2024-06-10 DIAGNOSIS — E11.9 TYPE 2 DIABETES MELLITUS WITHOUT COMPLICATIONS (MULTI): ICD-10-CM

## 2024-06-10 LAB
ALBUMIN SERPL BCP-MCNC: 4.1 G/DL (ref 3.4–5)
ALP SERPL-CCNC: 59 U/L (ref 33–136)
ALT SERPL W P-5'-P-CCNC: 11 U/L (ref 10–52)
ANION GAP SERPL CALC-SCNC: 9 MMOL/L (ref 10–20)
AST SERPL W P-5'-P-CCNC: 13 U/L (ref 9–39)
BILIRUB SERPL-MCNC: 0.7 MG/DL (ref 0–1.2)
BUN SERPL-MCNC: 13 MG/DL (ref 6–23)
CALCIUM SERPL-MCNC: 9.3 MG/DL (ref 8.6–10.3)
CHLORIDE SERPL-SCNC: 103 MMOL/L (ref 98–107)
CHOLEST SERPL-MCNC: 124 MG/DL (ref 0–199)
CHOLESTEROL/HDL RATIO: 4.1
CO2 SERPL-SCNC: 30 MMOL/L (ref 21–32)
CREAT SERPL-MCNC: 0.69 MG/DL (ref 0.5–1.3)
EGFRCR SERPLBLD CKD-EPI 2021: >90 ML/MIN/1.73M*2
EST. AVERAGE GLUCOSE BLD GHB EST-MCNC: 186 MG/DL
GLUCOSE SERPL-MCNC: 144 MG/DL (ref 74–99)
HBA1C MFR BLD: 8.1 %
HDLC SERPL-MCNC: 29.9 MG/DL
LDLC SERPL CALC-MCNC: 43 MG/DL
NON HDL CHOLESTEROL: 94 MG/DL (ref 0–149)
POTASSIUM SERPL-SCNC: 3.9 MMOL/L (ref 3.5–5.3)
PROT SERPL-MCNC: 6.5 G/DL (ref 6.4–8.2)
RAD ONC MSQ ACTUAL FRACTIONS DELIVERED: 22
RAD ONC MSQ ACTUAL SESSION DELIVERED DOSE: 200 CGRAY
RAD ONC MSQ ACTUAL TOTAL DOSE: 4400 CGRAY
RAD ONC MSQ ELAPSED DAYS: 32
RAD ONC MSQ LAST DATE: NORMAL
RAD ONC MSQ PRESCRIBED FRACTIONAL DOSE: 200 CGRAY
RAD ONC MSQ PRESCRIBED NUMBER OF FRACTIONS: 33
RAD ONC MSQ PRESCRIBED TECHNIQUE: NORMAL
RAD ONC MSQ PRESCRIBED TOTAL DOSE: 6600 CGRAY
RAD ONC MSQ PRESCRIPTION PATTERN COMMENT: NORMAL
RAD ONC MSQ START DATE: NORMAL
RAD ONC MSQ TREATMENT COURSE NUMBER: 1
RAD ONC MSQ TREATMENT SITE: NORMAL
SODIUM SERPL-SCNC: 138 MMOL/L (ref 136–145)
TRIGL SERPL-MCNC: 257 MG/DL (ref 0–149)
VLDL: 51 MG/DL (ref 0–40)

## 2024-06-10 PROCEDURE — 80061 LIPID PANEL: CPT

## 2024-06-10 PROCEDURE — 77385 HC INTENSITY-MODULATED RADIATION THERAPY (IMRT), SIMPLE: CPT | Performed by: STUDENT IN AN ORGANIZED HEALTH CARE EDUCATION/TRAINING PROGRAM

## 2024-06-10 PROCEDURE — 36415 COLL VENOUS BLD VENIPUNCTURE: CPT

## 2024-06-10 PROCEDURE — 83036 HEMOGLOBIN GLYCOSYLATED A1C: CPT

## 2024-06-10 PROCEDURE — 80053 COMPREHEN METABOLIC PANEL: CPT

## 2024-06-10 PROCEDURE — 77336 RADIATION PHYSICS CONSULT: CPT | Performed by: STUDENT IN AN ORGANIZED HEALTH CARE EDUCATION/TRAINING PROGRAM

## 2024-06-11 ENCOUNTER — HOSPITAL ENCOUNTER (OUTPATIENT)
Dept: RADIATION ONCOLOGY | Facility: CLINIC | Age: 64
Setting detail: RADIATION/ONCOLOGY SERIES
Discharge: HOME | End: 2024-06-11
Payer: COMMERCIAL

## 2024-06-11 DIAGNOSIS — C61 MALIGNANT NEOPLASM OF PROSTATE (MULTI): ICD-10-CM

## 2024-06-11 DIAGNOSIS — Z51.0 ENCOUNTER FOR ANTINEOPLASTIC RADIATION THERAPY: ICD-10-CM

## 2024-06-11 LAB
RAD ONC MSQ ACTUAL FRACTIONS DELIVERED: 23
RAD ONC MSQ ACTUAL SESSION DELIVERED DOSE: 200 CGRAY
RAD ONC MSQ ACTUAL TOTAL DOSE: 4600 CGRAY
RAD ONC MSQ ELAPSED DAYS: 33
RAD ONC MSQ LAST DATE: NORMAL
RAD ONC MSQ PRESCRIBED FRACTIONAL DOSE: 200 CGRAY
RAD ONC MSQ PRESCRIBED NUMBER OF FRACTIONS: 33
RAD ONC MSQ PRESCRIBED TECHNIQUE: NORMAL
RAD ONC MSQ PRESCRIBED TOTAL DOSE: 6600 CGRAY
RAD ONC MSQ PRESCRIPTION PATTERN COMMENT: NORMAL
RAD ONC MSQ START DATE: NORMAL
RAD ONC MSQ TREATMENT COURSE NUMBER: 1
RAD ONC MSQ TREATMENT SITE: NORMAL

## 2024-06-11 PROCEDURE — 77385 HC INTENSITY-MODULATED RADIATION THERAPY (IMRT), SIMPLE: CPT | Performed by: STUDENT IN AN ORGANIZED HEALTH CARE EDUCATION/TRAINING PROGRAM

## 2024-06-12 ENCOUNTER — HOSPITAL ENCOUNTER (OUTPATIENT)
Dept: RADIATION ONCOLOGY | Facility: CLINIC | Age: 64
Setting detail: RADIATION/ONCOLOGY SERIES
Discharge: HOME | End: 2024-06-12
Payer: COMMERCIAL

## 2024-06-12 DIAGNOSIS — Z51.0 ENCOUNTER FOR ANTINEOPLASTIC RADIATION THERAPY: ICD-10-CM

## 2024-06-12 DIAGNOSIS — C61 MALIGNANT NEOPLASM OF PROSTATE (MULTI): ICD-10-CM

## 2024-06-12 LAB
RAD ONC MSQ ACTUAL FRACTIONS DELIVERED: 24
RAD ONC MSQ ACTUAL SESSION DELIVERED DOSE: 200 CGRAY
RAD ONC MSQ ACTUAL TOTAL DOSE: 4800 CGRAY
RAD ONC MSQ ELAPSED DAYS: 34
RAD ONC MSQ LAST DATE: NORMAL
RAD ONC MSQ PRESCRIBED FRACTIONAL DOSE: 200 CGRAY
RAD ONC MSQ PRESCRIBED NUMBER OF FRACTIONS: 33
RAD ONC MSQ PRESCRIBED TECHNIQUE: NORMAL
RAD ONC MSQ PRESCRIBED TOTAL DOSE: 6600 CGRAY
RAD ONC MSQ PRESCRIPTION PATTERN COMMENT: NORMAL
RAD ONC MSQ START DATE: NORMAL
RAD ONC MSQ TREATMENT COURSE NUMBER: 1
RAD ONC MSQ TREATMENT SITE: NORMAL

## 2024-06-12 PROCEDURE — 77385 HC INTENSITY-MODULATED RADIATION THERAPY (IMRT), SIMPLE: CPT | Performed by: STUDENT IN AN ORGANIZED HEALTH CARE EDUCATION/TRAINING PROGRAM

## 2024-06-13 ENCOUNTER — HOSPITAL ENCOUNTER (OUTPATIENT)
Dept: RADIATION ONCOLOGY | Facility: CLINIC | Age: 64
Setting detail: RADIATION/ONCOLOGY SERIES
Discharge: HOME | End: 2024-06-13
Payer: COMMERCIAL

## 2024-06-13 ENCOUNTER — RADIATION ONCOLOGY OTV (OUTPATIENT)
Dept: RADIATION ONCOLOGY | Facility: CLINIC | Age: 64
End: 2024-06-13
Payer: COMMERCIAL

## 2024-06-13 VITALS
WEIGHT: 219 LBS | RESPIRATION RATE: 18 BRPM | BODY MASS INDEX: 29.7 KG/M2 | OXYGEN SATURATION: 95 % | DIASTOLIC BLOOD PRESSURE: 74 MMHG | HEART RATE: 81 BPM | SYSTOLIC BLOOD PRESSURE: 157 MMHG | TEMPERATURE: 97.7 F

## 2024-06-13 DIAGNOSIS — C61 MALIGNANT NEOPLASM OF PROSTATE (MULTI): ICD-10-CM

## 2024-06-13 DIAGNOSIS — Z51.0 ENCOUNTER FOR ANTINEOPLASTIC RADIATION THERAPY: ICD-10-CM

## 2024-06-13 LAB
RAD ONC MSQ ACTUAL FRACTIONS DELIVERED: 25
RAD ONC MSQ ACTUAL SESSION DELIVERED DOSE: 200 CGRAY
RAD ONC MSQ ACTUAL TOTAL DOSE: 5000 CGRAY
RAD ONC MSQ ELAPSED DAYS: 35
RAD ONC MSQ LAST DATE: NORMAL
RAD ONC MSQ PRESCRIBED FRACTIONAL DOSE: 200 CGRAY
RAD ONC MSQ PRESCRIBED NUMBER OF FRACTIONS: 33
RAD ONC MSQ PRESCRIBED TECHNIQUE: NORMAL
RAD ONC MSQ PRESCRIBED TOTAL DOSE: 6600 CGRAY
RAD ONC MSQ PRESCRIPTION PATTERN COMMENT: NORMAL
RAD ONC MSQ START DATE: NORMAL
RAD ONC MSQ TREATMENT COURSE NUMBER: 1
RAD ONC MSQ TREATMENT SITE: NORMAL

## 2024-06-13 PROCEDURE — 77385 HC INTENSITY-MODULATED RADIATION THERAPY (IMRT), SIMPLE: CPT | Performed by: STUDENT IN AN ORGANIZED HEALTH CARE EDUCATION/TRAINING PROGRAM

## 2024-06-13 ASSESSMENT — PAIN SCALES - GENERAL: PAINLEVEL: 0-NO PAIN

## 2024-06-13 NOTE — PROGRESS NOTES
Radiation Oncology On Treatment Visit    Patient Name:  Serafin Salgado  MRN:  [id number]  :  1960    Referring Provider: No ref. provider found  Primary Care Provider: No primary care provider on file.  Care Team: Patient Care Team:  ANDRESSA Guerrero-CNP as PCP - [facility] PCP    Date of Service: 2024     Diagnosis:   Specialty Problems          Radiation Oncology Problems    Prostate cancer (Multi)         Treatment Summary:  IMRT: Prostate bed    Treatment Period Technique Fraction Dose Fractions Total Dose   Course 1 2024-2024  (days elapsed: 35)         ProstBed 2024-2024 VMAT 200 / 200 cGy  5000 / 6,600 cGy     SUBJECTIVE:   He feels bowel movements are less regular this week, trending towards constipation.  He is using senna S which helps. No changes in urinary habits.      OBJECTIVE:   Vital Signs:  /74   Pulse 81   Temp 36.5 °C (97.7 °F)   Resp 18   Wt 99.3 kg (219 lb)   SpO2 95%   BMI 29.70 kg/m²    Pain Scale: The patient's current pain level was assessed.  They report currently having a pain of 0 out of 10.    Other Pertinent Findings:     Toxicity Assessment          2024    16:35 2024    16:13 2024    16:00 2024    16:07 2024    15:52 2024    16:12   Toxicity Assessment   Adverse Events Reviewed (WDL) No (Exceptions to WDL) No (Exceptions to WDL) No (Exceptions to WDL) No (Exceptions to WDL) No (Exceptions to WDL) No (Exceptions to WDL)   Treatment Site Pelvis - male Pelvis - male Pelvis - male Pelvis - male Pelvis - male Pelvis - male   Diarrhea     Grade 0    Fatigue  Grade 1 Grade 1 Grade 1 Grade 0 Grade 1        Assessment / Plan:  The patient is tolerating radiation therapy as anticipated.  Continue per current treatment plan.       Thank you for allowing me to participate in this patient's care.    Jluis Cooper MD  Department of Radiation Oncology  [facility]    Portions of this note were generated using voice  recognition software, and may be subject to transcription errors.

## 2024-06-14 ENCOUNTER — HOSPITAL ENCOUNTER (OUTPATIENT)
Dept: RADIATION ONCOLOGY | Facility: CLINIC | Age: 64
Setting detail: RADIATION/ONCOLOGY SERIES
Discharge: HOME | End: 2024-06-14
Payer: COMMERCIAL

## 2024-06-14 DIAGNOSIS — Z51.0 ENCOUNTER FOR ANTINEOPLASTIC RADIATION THERAPY: ICD-10-CM

## 2024-06-14 DIAGNOSIS — C61 MALIGNANT NEOPLASM OF PROSTATE (MULTI): ICD-10-CM

## 2024-06-14 LAB
RAD ONC MSQ ACTUAL FRACTIONS DELIVERED: 26
RAD ONC MSQ ACTUAL SESSION DELIVERED DOSE: 200 CGRAY
RAD ONC MSQ ACTUAL TOTAL DOSE: 5200 CGRAY
RAD ONC MSQ ELAPSED DAYS: 36
RAD ONC MSQ LAST DATE: NORMAL
RAD ONC MSQ PRESCRIBED FRACTIONAL DOSE: 200 CGRAY
RAD ONC MSQ PRESCRIBED NUMBER OF FRACTIONS: 33
RAD ONC MSQ PRESCRIBED TECHNIQUE: NORMAL
RAD ONC MSQ PRESCRIBED TOTAL DOSE: 6600 CGRAY
RAD ONC MSQ PRESCRIPTION PATTERN COMMENT: NORMAL
RAD ONC MSQ START DATE: NORMAL
RAD ONC MSQ TREATMENT COURSE NUMBER: 1
RAD ONC MSQ TREATMENT SITE: NORMAL

## 2024-06-14 PROCEDURE — 77385 HC INTENSITY-MODULATED RADIATION THERAPY (IMRT), SIMPLE: CPT | Performed by: STUDENT IN AN ORGANIZED HEALTH CARE EDUCATION/TRAINING PROGRAM

## 2024-06-17 ENCOUNTER — HOSPITAL ENCOUNTER (OUTPATIENT)
Dept: RADIATION ONCOLOGY | Facility: CLINIC | Age: 64
Setting detail: RADIATION/ONCOLOGY SERIES
Discharge: HOME | End: 2024-06-17
Payer: COMMERCIAL

## 2024-06-17 PROCEDURE — 77385 HC INTENSITY-MODULATED RADIATION THERAPY (IMRT), SIMPLE: CPT | Performed by: STUDENT IN AN ORGANIZED HEALTH CARE EDUCATION/TRAINING PROGRAM

## 2024-06-17 PROCEDURE — 77336 RADIATION PHYSICS CONSULT: CPT | Performed by: STUDENT IN AN ORGANIZED HEALTH CARE EDUCATION/TRAINING PROGRAM

## 2024-06-18 ENCOUNTER — HOSPITAL ENCOUNTER (OUTPATIENT)
Dept: RADIATION ONCOLOGY | Facility: CLINIC | Age: 64
Setting detail: RADIATION/ONCOLOGY SERIES
Discharge: HOME | End: 2024-06-18
Payer: COMMERCIAL

## 2024-06-18 DIAGNOSIS — C61 MALIGNANT NEOPLASM OF PROSTATE (MULTI): ICD-10-CM

## 2024-06-18 DIAGNOSIS — Z51.0 ENCOUNTER FOR ANTINEOPLASTIC RADIATION THERAPY: ICD-10-CM

## 2024-06-18 LAB
RAD ONC MSQ ACTUAL FRACTIONS DELIVERED: 28
RAD ONC MSQ ACTUAL SESSION DELIVERED DOSE: 200 CGRAY
RAD ONC MSQ ACTUAL TOTAL DOSE: 5600 CGRAY
RAD ONC MSQ ELAPSED DAYS: 40
RAD ONC MSQ LAST DATE: NORMAL
RAD ONC MSQ PRESCRIBED FRACTIONAL DOSE: 200 CGRAY
RAD ONC MSQ PRESCRIBED NUMBER OF FRACTIONS: 33
RAD ONC MSQ PRESCRIBED TECHNIQUE: NORMAL
RAD ONC MSQ PRESCRIBED TOTAL DOSE: 6600 CGRAY
RAD ONC MSQ PRESCRIPTION PATTERN COMMENT: NORMAL
RAD ONC MSQ START DATE: NORMAL
RAD ONC MSQ TREATMENT COURSE NUMBER: 1
RAD ONC MSQ TREATMENT SITE: NORMAL

## 2024-06-18 PROCEDURE — 77385 HC INTENSITY-MODULATED RADIATION THERAPY (IMRT), SIMPLE: CPT | Performed by: STUDENT IN AN ORGANIZED HEALTH CARE EDUCATION/TRAINING PROGRAM

## 2024-06-19 ENCOUNTER — HOSPITAL ENCOUNTER (OUTPATIENT)
Dept: RADIATION ONCOLOGY | Facility: CLINIC | Age: 64
Setting detail: RADIATION/ONCOLOGY SERIES
Discharge: HOME | End: 2024-06-19
Payer: COMMERCIAL

## 2024-06-19 DIAGNOSIS — Z51.0 ENCOUNTER FOR ANTINEOPLASTIC RADIATION THERAPY: ICD-10-CM

## 2024-06-19 DIAGNOSIS — C61 MALIGNANT NEOPLASM OF PROSTATE (MULTI): ICD-10-CM

## 2024-06-19 LAB
RAD ONC MSQ ACTUAL FRACTIONS DELIVERED: 29
RAD ONC MSQ ACTUAL SESSION DELIVERED DOSE: 200 CGRAY
RAD ONC MSQ ACTUAL TOTAL DOSE: 5800 CGRAY
RAD ONC MSQ ELAPSED DAYS: 41
RAD ONC MSQ LAST DATE: NORMAL
RAD ONC MSQ PRESCRIBED FRACTIONAL DOSE: 200 CGRAY
RAD ONC MSQ PRESCRIBED NUMBER OF FRACTIONS: 33
RAD ONC MSQ PRESCRIBED TECHNIQUE: NORMAL
RAD ONC MSQ PRESCRIBED TOTAL DOSE: 6600 CGRAY
RAD ONC MSQ PRESCRIPTION PATTERN COMMENT: NORMAL
RAD ONC MSQ START DATE: NORMAL
RAD ONC MSQ TREATMENT COURSE NUMBER: 1
RAD ONC MSQ TREATMENT SITE: NORMAL

## 2024-06-19 PROCEDURE — 77385 HC INTENSITY-MODULATED RADIATION THERAPY (IMRT), SIMPLE: CPT | Performed by: STUDENT IN AN ORGANIZED HEALTH CARE EDUCATION/TRAINING PROGRAM

## 2024-06-20 ENCOUNTER — RADIATION ONCOLOGY OTV (OUTPATIENT)
Dept: RADIATION ONCOLOGY | Facility: CLINIC | Age: 64
End: 2024-06-20
Payer: COMMERCIAL

## 2024-06-20 ENCOUNTER — HOSPITAL ENCOUNTER (OUTPATIENT)
Dept: RADIATION ONCOLOGY | Facility: CLINIC | Age: 64
Setting detail: RADIATION/ONCOLOGY SERIES
Discharge: HOME | End: 2024-06-20
Payer: COMMERCIAL

## 2024-06-20 VITALS
WEIGHT: 216.4 LBS | TEMPERATURE: 97.2 F | HEART RATE: 77 BPM | SYSTOLIC BLOOD PRESSURE: 128 MMHG | DIASTOLIC BLOOD PRESSURE: 77 MMHG | RESPIRATION RATE: 18 BRPM | OXYGEN SATURATION: 96 % | BODY MASS INDEX: 29.35 KG/M2

## 2024-06-20 DIAGNOSIS — C61 MALIGNANT NEOPLASM OF PROSTATE (MULTI): ICD-10-CM

## 2024-06-20 DIAGNOSIS — C61 PROSTATE CANCER (MULTI): Primary | ICD-10-CM

## 2024-06-20 DIAGNOSIS — Z51.0 ENCOUNTER FOR ANTINEOPLASTIC RADIATION THERAPY: ICD-10-CM

## 2024-06-20 LAB
RAD ONC MSQ ACTUAL FRACTIONS DELIVERED: 30
RAD ONC MSQ ACTUAL SESSION DELIVERED DOSE: 200 CGRAY
RAD ONC MSQ ACTUAL TOTAL DOSE: 6000 CGRAY
RAD ONC MSQ ELAPSED DAYS: 42
RAD ONC MSQ LAST DATE: NORMAL
RAD ONC MSQ PRESCRIBED FRACTIONAL DOSE: 200 CGRAY
RAD ONC MSQ PRESCRIBED NUMBER OF FRACTIONS: 33
RAD ONC MSQ PRESCRIBED TECHNIQUE: NORMAL
RAD ONC MSQ PRESCRIBED TOTAL DOSE: 6600 CGRAY
RAD ONC MSQ PRESCRIPTION PATTERN COMMENT: NORMAL
RAD ONC MSQ START DATE: NORMAL
RAD ONC MSQ TREATMENT COURSE NUMBER: 1
RAD ONC MSQ TREATMENT SITE: NORMAL

## 2024-06-20 PROCEDURE — 77385 HC INTENSITY-MODULATED RADIATION THERAPY (IMRT), SIMPLE: CPT | Performed by: STUDENT IN AN ORGANIZED HEALTH CARE EDUCATION/TRAINING PROGRAM

## 2024-06-20 ASSESSMENT — PAIN SCALES - GENERAL: PAINLEVEL: 0-NO PAIN

## 2024-06-20 NOTE — PROGRESS NOTES
Radiation Oncology On Treatment Visit    Patient Name:  Serafin Salgado  MRN:  [id number]  :  1960    Referring Provider: No ref. provider found  Primary Care Provider: No primary care provider on file.  Care Team: Patient Care Team:  TREE Guerrero as PCP - [facility] PCP    Date of Service: 2024     Diagnosis:   Specialty Problems          Radiation Oncology Problems    Prostate cancer (Multi)         Treatment Summary:  IMRT: Prostate bed    Treatment Period Technique Fraction Dose Fractions Total Dose   Course 1 2024-2024  (days elapsed: 42)         ProstBed 2024-2024 VMAT 200 / 200 cGy  6000 / 6,600 cGy     SUBJECTIVE:   Occasionally does not completely empty his bladder when he urinates.  Denies dysuria or hematuria.  No hematochezia.      OBJECTIVE:   Vital Signs:  /77   Pulse 77   Temp 36.2 °C (97.2 °F)   Resp 18   Wt 98.2 kg (216 lb 6.4 oz)   SpO2 96%   BMI 29.35 kg/m²    Pain Scale: The patient's current pain level was assessed.  They report currently having a pain of 0 out of 10.    Other Pertinent Findings:     Toxicity Assessment          2024    16:13 2024    16:00 2024    16:07 2024    15:52 2024    16:12 2024    16:02   Toxicity Assessment   Adverse Events Reviewed (WDL) No (Exceptions to WDL) No (Exceptions to WDL) No (Exceptions to WDL) No (Exceptions to WDL) No (Exceptions to WDL) No (Exceptions to WDL)   Treatment Site Pelvis - male Pelvis - male Pelvis - male Pelvis - male Pelvis - male Pelvis - male   Diarrhea    Grade 0     Fatigue Grade 1 Grade 1 Grade 1 Grade 0 Grade 1 Grade 1   Urinary Retention      Grade 1       incomplete emptying        Assessment / Plan:  The patient is tolerating radiation therapy as anticipated.  Continue per current treatment plan.     End of treatment next week.  Will follow-up in 3 months with PSA at that time.    Thank you for allowing me to participate in this patient's care.    Jluis  MD Kenneth  Department of Radiation Oncology  [facility]    Portions of this note were generated using voice recognition software, and may be subject to transcription errors.

## 2024-06-21 ENCOUNTER — HOSPITAL ENCOUNTER (OUTPATIENT)
Dept: RADIATION ONCOLOGY | Facility: CLINIC | Age: 64
Setting detail: RADIATION/ONCOLOGY SERIES
Discharge: HOME | End: 2024-06-21
Payer: COMMERCIAL

## 2024-06-21 DIAGNOSIS — C61 MALIGNANT NEOPLASM OF PROSTATE (MULTI): ICD-10-CM

## 2024-06-21 DIAGNOSIS — Z51.0 ENCOUNTER FOR ANTINEOPLASTIC RADIATION THERAPY: ICD-10-CM

## 2024-06-21 LAB
RAD ONC MSQ ACTUAL FRACTIONS DELIVERED: 31
RAD ONC MSQ ACTUAL SESSION DELIVERED DOSE: 200 CGRAY
RAD ONC MSQ ACTUAL TOTAL DOSE: 6200 CGRAY
RAD ONC MSQ ELAPSED DAYS: 43
RAD ONC MSQ LAST DATE: NORMAL
RAD ONC MSQ PRESCRIBED FRACTIONAL DOSE: 200 CGRAY
RAD ONC MSQ PRESCRIBED NUMBER OF FRACTIONS: 33
RAD ONC MSQ PRESCRIBED TECHNIQUE: NORMAL
RAD ONC MSQ PRESCRIBED TOTAL DOSE: 6600 CGRAY
RAD ONC MSQ PRESCRIPTION PATTERN COMMENT: NORMAL
RAD ONC MSQ START DATE: NORMAL
RAD ONC MSQ TREATMENT COURSE NUMBER: 1
RAD ONC MSQ TREATMENT SITE: NORMAL

## 2024-06-21 PROCEDURE — 77385 HC INTENSITY-MODULATED RADIATION THERAPY (IMRT), SIMPLE: CPT | Performed by: STUDENT IN AN ORGANIZED HEALTH CARE EDUCATION/TRAINING PROGRAM

## 2024-06-24 ENCOUNTER — HOSPITAL ENCOUNTER (OUTPATIENT)
Dept: RADIATION ONCOLOGY | Facility: CLINIC | Age: 64
Setting detail: RADIATION/ONCOLOGY SERIES
Discharge: HOME | End: 2024-06-24
Payer: COMMERCIAL

## 2024-06-24 DIAGNOSIS — C61 MALIGNANT NEOPLASM OF PROSTATE (MULTI): ICD-10-CM

## 2024-06-24 DIAGNOSIS — Z51.0 ENCOUNTER FOR ANTINEOPLASTIC RADIATION THERAPY: ICD-10-CM

## 2024-06-24 LAB
RAD ONC MSQ ACTUAL FRACTIONS DELIVERED: 32
RAD ONC MSQ ACTUAL SESSION DELIVERED DOSE: 200 CGRAY
RAD ONC MSQ ACTUAL TOTAL DOSE: 6400 CGRAY
RAD ONC MSQ ELAPSED DAYS: 46
RAD ONC MSQ LAST DATE: NORMAL
RAD ONC MSQ PRESCRIBED FRACTIONAL DOSE: 200 CGRAY
RAD ONC MSQ PRESCRIBED NUMBER OF FRACTIONS: 33
RAD ONC MSQ PRESCRIBED TECHNIQUE: NORMAL
RAD ONC MSQ PRESCRIBED TOTAL DOSE: 6600 CGRAY
RAD ONC MSQ PRESCRIPTION PATTERN COMMENT: NORMAL
RAD ONC MSQ START DATE: NORMAL
RAD ONC MSQ TREATMENT COURSE NUMBER: 1
RAD ONC MSQ TREATMENT SITE: NORMAL

## 2024-06-24 PROCEDURE — 77336 RADIATION PHYSICS CONSULT: CPT | Performed by: STUDENT IN AN ORGANIZED HEALTH CARE EDUCATION/TRAINING PROGRAM

## 2024-06-24 PROCEDURE — 77385 HC INTENSITY-MODULATED RADIATION THERAPY (IMRT), SIMPLE: CPT | Performed by: STUDENT IN AN ORGANIZED HEALTH CARE EDUCATION/TRAINING PROGRAM

## 2024-06-25 ENCOUNTER — HOSPITAL ENCOUNTER (OUTPATIENT)
Dept: RADIATION ONCOLOGY | Facility: CLINIC | Age: 64
Setting detail: RADIATION/ONCOLOGY SERIES
Discharge: HOME | End: 2024-06-25
Payer: COMMERCIAL

## 2024-06-25 ENCOUNTER — DOCUMENTATION (OUTPATIENT)
Dept: RADIATION ONCOLOGY | Facility: CLINIC | Age: 64
End: 2024-06-25

## 2024-06-25 DIAGNOSIS — C61 MALIGNANT NEOPLASM OF PROSTATE (MULTI): ICD-10-CM

## 2024-06-25 DIAGNOSIS — Z51.0 ENCOUNTER FOR ANTINEOPLASTIC RADIATION THERAPY: ICD-10-CM

## 2024-06-25 LAB
RAD ONC MSQ ACTUAL FRACTIONS DELIVERED: 33
RAD ONC MSQ ACTUAL SESSION DELIVERED DOSE: 200 CGRAY
RAD ONC MSQ ACTUAL TOTAL DOSE: 6600 CGRAY
RAD ONC MSQ ELAPSED DAYS: 47
RAD ONC MSQ LAST DATE: NORMAL
RAD ONC MSQ PRESCRIBED FRACTIONAL DOSE: 200 CGRAY
RAD ONC MSQ PRESCRIBED NUMBER OF FRACTIONS: 33
RAD ONC MSQ PRESCRIBED TECHNIQUE: NORMAL
RAD ONC MSQ PRESCRIBED TOTAL DOSE: 6600 CGRAY
RAD ONC MSQ PRESCRIPTION PATTERN COMMENT: NORMAL
RAD ONC MSQ START DATE: NORMAL
RAD ONC MSQ TREATMENT COURSE NUMBER: 1
RAD ONC MSQ TREATMENT SITE: NORMAL

## 2024-06-25 PROCEDURE — 77385 HC INTENSITY-MODULATED RADIATION THERAPY (IMRT), SIMPLE: CPT | Performed by: STUDENT IN AN ORGANIZED HEALTH CARE EDUCATION/TRAINING PROGRAM

## 2024-07-01 NOTE — PROGRESS NOTES
Radiation Oncology Treatment Summary    Patient Name:  Serafin Salgado  MRN:  [id number]  :  1960    Referring Provider: Adrien Peguero MD MPH  Primary Care Provider: No primary care provider on file.    Brief History: Serafin Salgado is a 64 y.o. male with rising PSA after prostatectomy for prostate cancer, initially GG4 iPSA 21.74 pT3aN0 +focal WILLIS, +PNI, +margins (right and left anterior), surgery 3/2023, post-op PSA undetectable with subsequent rise to 0.22. DECIPHER intermediate risk, 0.48. Patient opted against ADT. S/p prostate bed RT 66 Gy in 33 fx completed 6/15/24.    Radiation Treatment Summary:    IMRT: Prostate bed (Resolved)    Treatment Period Technique Fraction Dose Fractions Total Dose   Course 1 2024-2024  (days elapsed: 47)         ProstBed 2024-2024 VMAT 200 / 200 cGy  6600 / 6,600 cGy       Please see the patient's Mosaiq chart for further details regarding the radiation plan, including beam energy.    Concurrent Chemotherapy:  none    CTCAE Toxicity Overview:   Toxicity Assessment          2024    16:13 2024    16:00 2024    16:07 2024    15:52 2024    16:12 2024    16:02   Toxicity Assessment   Adverse Events Reviewed (WDL) No (Exceptions to WDL) No (Exceptions to WDL) No (Exceptions to WDL) No (Exceptions to WDL) No (Exceptions to WDL) No (Exceptions to WDL)   Treatment Site Pelvis - male Pelvis - male Pelvis - male Pelvis - male Pelvis - male Pelvis - male   Diarrhea    Grade 0     Fatigue Grade 1 Grade 1 Grade 1 Grade 0 Grade 1 Grade 1   Urinary Retention      Grade 1       incomplete emptying     Patient Disposition: Patient to return to clinic in 3 months with PSA prior. He was instructed to reach out with any questions or concerns in the meantime.    Thank you for allowing me to participate in this patient's care.    Jluis Cooper MD  Department of Radiation Oncology  [facility]    Portions of this note were generated  using voice recognition software, and may be subject to transcription errors.

## 2024-07-02 ENCOUNTER — LAB (OUTPATIENT)
Dept: LAB | Facility: LAB | Age: 64
End: 2024-07-02
Payer: COMMERCIAL

## 2024-07-02 ENCOUNTER — TELEPHONE (OUTPATIENT)
Dept: RADIATION ONCOLOGY | Facility: CLINIC | Age: 64
End: 2024-07-02
Payer: COMMERCIAL

## 2024-07-02 DIAGNOSIS — R30.0 DYSURIA: ICD-10-CM

## 2024-07-02 DIAGNOSIS — R30.0 DYSURIA: Primary | ICD-10-CM

## 2024-07-02 LAB
APPEARANCE UR: CLEAR
BACTERIA #/AREA URNS AUTO: ABNORMAL /HPF
BILIRUB UR STRIP.AUTO-MCNC: NEGATIVE MG/DL
COLOR UR: YELLOW
GLUCOSE UR STRIP.AUTO-MCNC: NORMAL MG/DL
HYALINE CASTS #/AREA URNS AUTO: ABNORMAL /LPF
KETONES UR STRIP.AUTO-MCNC: NEGATIVE MG/DL
LEUKOCYTE ESTERASE UR QL STRIP.AUTO: NEGATIVE
MUCOUS THREADS #/AREA URNS AUTO: ABNORMAL /LPF
NITRITE UR QL STRIP.AUTO: NEGATIVE
PH UR STRIP.AUTO: 6 [PH]
PROT UR STRIP.AUTO-MCNC: NORMAL MG/DL
RBC # UR STRIP.AUTO: NEGATIVE /UL
RBC #/AREA URNS AUTO: ABNORMAL /HPF
SP GR UR STRIP.AUTO: 1.02
UROBILINOGEN UR STRIP.AUTO-MCNC: NORMAL MG/DL
WBC #/AREA URNS AUTO: ABNORMAL /HPF

## 2024-07-02 PROCEDURE — 81001 URINALYSIS AUTO W/SCOPE: CPT

## 2024-07-02 NOTE — TELEPHONE ENCOUNTER
Serafin called today saying that he has been having increased urinary frequency and burning with urination. I told him that I would pass this along to Dr. Cooper and I woud suspect that a urine sample will be ordered to see if he has a UTI.

## 2024-07-03 LAB — HOLD SPECIMEN: NORMAL

## 2024-09-23 ENCOUNTER — LAB (OUTPATIENT)
Dept: LAB | Facility: LAB | Age: 64
End: 2024-09-23
Payer: COMMERCIAL

## 2024-09-23 DIAGNOSIS — E11.9 TYPE 2 DIABETES MELLITUS WITHOUT COMPLICATIONS (MULTI): Primary | ICD-10-CM

## 2024-09-23 DIAGNOSIS — E03.9 HYPOTHYROIDISM, UNSPECIFIED: ICD-10-CM

## 2024-09-23 DIAGNOSIS — E55.9 VITAMIN D DEFICIENCY, UNSPECIFIED: ICD-10-CM

## 2024-09-23 DIAGNOSIS — E53.8 DEFICIENCY OF OTHER SPECIFIED B GROUP VITAMINS: ICD-10-CM

## 2024-09-23 DIAGNOSIS — C61 PROSTATE CANCER (MULTI): ICD-10-CM

## 2024-09-23 LAB
ALBUMIN SERPL BCP-MCNC: 4.3 G/DL (ref 3.4–5)
ALP SERPL-CCNC: 62 U/L (ref 33–136)
ALT SERPL W P-5'-P-CCNC: 8 U/L (ref 10–52)
ANION GAP SERPL CALC-SCNC: 15 MMOL/L (ref 10–20)
AST SERPL W P-5'-P-CCNC: 10 U/L (ref 9–39)
BASOPHILS # BLD AUTO: 0.04 X10*3/UL (ref 0–0.1)
BASOPHILS NFR BLD AUTO: 0.5 %
BILIRUB SERPL-MCNC: 0.6 MG/DL (ref 0–1.2)
BUN SERPL-MCNC: 15 MG/DL (ref 6–23)
CALCIUM SERPL-MCNC: 9.6 MG/DL (ref 8.6–10.3)
CHLORIDE SERPL-SCNC: 99 MMOL/L (ref 98–107)
CO2 SERPL-SCNC: 30 MMOL/L (ref 21–32)
CREAT SERPL-MCNC: 0.78 MG/DL (ref 0.5–1.3)
CREAT UR-MCNC: 176.3 MG/DL (ref 20–370)
EGFRCR SERPLBLD CKD-EPI 2021: >90 ML/MIN/1.73M*2
EOSINOPHIL # BLD AUTO: 0.17 X10*3/UL (ref 0–0.7)
EOSINOPHIL NFR BLD AUTO: 2.2 %
ERYTHROCYTE [DISTWIDTH] IN BLOOD BY AUTOMATED COUNT: 14.6 % (ref 11.5–14.5)
GLUCOSE SERPL-MCNC: 63 MG/DL (ref 74–99)
HCT VFR BLD AUTO: 49.7 % (ref 41–52)
HGB BLD-MCNC: 16.2 G/DL (ref 13.5–17.5)
IMM GRANULOCYTES # BLD AUTO: 0.09 X10*3/UL (ref 0–0.7)
IMM GRANULOCYTES NFR BLD AUTO: 1.2 % (ref 0–0.9)
LYMPHOCYTES # BLD AUTO: 1.87 X10*3/UL (ref 1.2–4.8)
LYMPHOCYTES NFR BLD AUTO: 24.3 %
MCH RBC QN AUTO: 29.2 PG (ref 26–34)
MCHC RBC AUTO-ENTMCNC: 32.6 G/DL (ref 32–36)
MCV RBC AUTO: 90 FL (ref 80–100)
MICROALBUMIN UR-MCNC: 58.3 MG/L
MICROALBUMIN/CREAT UR: 33.1 UG/MG CREAT
MONOCYTES # BLD AUTO: 0.58 X10*3/UL (ref 0.1–1)
MONOCYTES NFR BLD AUTO: 7.5 %
NEUTROPHILS # BLD AUTO: 4.95 X10*3/UL (ref 1.2–7.7)
NEUTROPHILS NFR BLD AUTO: 64.3 %
NRBC BLD-RTO: 0 /100 WBCS (ref 0–0)
PLATELET # BLD AUTO: 227 X10*3/UL (ref 150–450)
POTASSIUM SERPL-SCNC: 3.7 MMOL/L (ref 3.5–5.3)
PROT SERPL-MCNC: 6.9 G/DL (ref 6.4–8.2)
RBC # BLD AUTO: 5.55 X10*6/UL (ref 4.5–5.9)
SODIUM SERPL-SCNC: 140 MMOL/L (ref 136–145)
T4 FREE SERPL-MCNC: 0.82 NG/DL (ref 0.61–1.12)
TSH SERPL-ACNC: 2.37 MIU/L (ref 0.44–3.98)
WBC # BLD AUTO: 7.7 X10*3/UL (ref 4.4–11.3)

## 2024-09-23 PROCEDURE — 82043 UR ALBUMIN QUANTITATIVE: CPT

## 2024-09-23 PROCEDURE — 85025 COMPLETE CBC W/AUTO DIFF WBC: CPT

## 2024-09-23 PROCEDURE — 84153 ASSAY OF PSA TOTAL: CPT

## 2024-09-23 PROCEDURE — 82306 VITAMIN D 25 HYDROXY: CPT

## 2024-09-23 PROCEDURE — 80053 COMPREHEN METABOLIC PANEL: CPT

## 2024-09-23 PROCEDURE — 36415 COLL VENOUS BLD VENIPUNCTURE: CPT

## 2024-09-23 PROCEDURE — 82570 ASSAY OF URINE CREATININE: CPT

## 2024-09-23 PROCEDURE — 83036 HEMOGLOBIN GLYCOSYLATED A1C: CPT

## 2024-09-23 PROCEDURE — 84439 ASSAY OF FREE THYROXINE: CPT

## 2024-09-23 PROCEDURE — 82607 VITAMIN B-12: CPT

## 2024-09-23 PROCEDURE — 84443 ASSAY THYROID STIM HORMONE: CPT

## 2024-09-24 LAB
25(OH)D3 SERPL-MCNC: 63 NG/ML (ref 30–100)
EST. AVERAGE GLUCOSE BLD GHB EST-MCNC: 154 MG/DL
HBA1C MFR BLD: 7 %
PSA SERPL-MCNC: 0.35 NG/ML
VIT B12 SERPL-MCNC: 305 PG/ML (ref 211–911)

## 2024-09-27 ENCOUNTER — HOSPITAL ENCOUNTER (OUTPATIENT)
Dept: RADIATION ONCOLOGY | Facility: CLINIC | Age: 64
Setting detail: RADIATION/ONCOLOGY SERIES
Discharge: HOME | End: 2024-09-27
Payer: COMMERCIAL

## 2024-09-27 VITALS
SYSTOLIC BLOOD PRESSURE: 131 MMHG | BODY MASS INDEX: 29.27 KG/M2 | RESPIRATION RATE: 18 BRPM | WEIGHT: 215.8 LBS | HEART RATE: 80 BPM | OXYGEN SATURATION: 95 % | DIASTOLIC BLOOD PRESSURE: 77 MMHG | TEMPERATURE: 97.3 F

## 2024-09-27 DIAGNOSIS — C61 PROSTATE CANCER (MULTI): Primary | ICD-10-CM

## 2024-09-27 PROCEDURE — 99213 OFFICE O/P EST LOW 20 MIN: CPT | Performed by: STUDENT IN AN ORGANIZED HEALTH CARE EDUCATION/TRAINING PROGRAM

## 2024-09-27 PROCEDURE — G2211 COMPLEX E/M VISIT ADD ON: HCPCS | Performed by: STUDENT IN AN ORGANIZED HEALTH CARE EDUCATION/TRAINING PROGRAM

## 2024-09-27 ASSESSMENT — ENCOUNTER SYMPTOMS
ENDOCRINE NEGATIVE: 1
PSYCHIATRIC NEGATIVE: 1
EYES NEGATIVE: 1
CONSTITUTIONAL NEGATIVE: 1
CARDIOVASCULAR NEGATIVE: 1
HEMATOLOGIC/LYMPHATIC NEGATIVE: 1
NEUROLOGICAL NEGATIVE: 1
RESPIRATORY NEGATIVE: 1
GASTROINTESTINAL NEGATIVE: 1
ARTHRALGIAS: 1

## 2024-09-27 ASSESSMENT — PAIN SCALES - GENERAL: PAINLEVEL: 0-NO PAIN

## 2024-09-27 NOTE — PROGRESS NOTES
Radiation Oncology Nursing Note    Pain: The patient's current pain level was assessed.  They report currently having a pain of 0 out of 10.  They feel their pain is under control without the use of pain medications.    Review of Systems:  Review of Systems   Constitutional: Negative.    HENT:  Negative.     Eyes: Negative.    Respiratory: Negative.     Cardiovascular: Negative.    Gastrointestinal: Negative.    Endocrine: Negative.    Genitourinary: Negative.     Musculoskeletal:  Positive for arthralgias.   Skin: Negative.    Neurological: Negative.    Hematological: Negative.    Psychiatric/Behavioral: Negative.

## 2024-09-27 NOTE — PROGRESS NOTES
Radiation Oncology Follow-Up    Patient Name:  Serafin Salgado  MRN:  [id number]  :  1960    Primary Care Provider: No primary care provider on file.  Care Team: Patient Care Team:  TREE Guerrero as PCP - [facility] ACO PCP  Jluis Cooper MD as Radiation Oncologist (Radiation Oncology)    Date of Service: 2024     SUBJECTIVE  History of Present Illness:   Serafin Salgado is a 64 y.o. male with BCR after prostatectomy for prostate cancer, initially GG4 iPSA 21.74 pT3aN0 +focal WILLIS, +PNI, +margins (right and left anterior), surgery 3/2023, post-op PSA undetectable with subsequent rise to 0.22. DECIPHER intermediate risk, 0.48. Patient opted against ADT. S/p prostate bed RT 66 Gy in 33 fx completed 2024.  He returns today for follow-up.  His postradiation PSA has continued to rise, 0.35  ng/ml.  He states he is urinating well without complaints, no dysuria or hematuria, denies significant frequency.  Bowel habits at baseline without hematochezia or tenesmus. No potency.    Treatment Rendered:   IMRT: Prostate bed (Resolved)    Treatment Period Technique Fraction Dose Fractions Total Dose   Course 1 2024-2024  (days elapsed: 47)         ProstBed 2024-2024 VMAT 200 / 200 cGy  6600 / 6,600 cGy       Review of Systems:   Review of Systems - Oncology  - please see nursing note    Performance Status:   The Karnofsky performance scale today is 90, Able to carry on normal activity; minor signs or symptoms of disease (ECOG equivalent 0).      OBJECTIVE  /77   Pulse 80   Temp 36.3 °C (97.3 °F)   Resp 18   Wt 97.9 kg (215 lb 12.8 oz)   SpO2 95%   BMI 29.27 kg/m²    Physical Exam  Vitals reviewed.   Constitutional:       General: He is not in acute distress.  HENT:      Head: Normocephalic and atraumatic.   Pulmonary:      Effort: Pulmonary effort is normal. No respiratory distress.   Abdominal:      General: There is no distension.   Skin:     Findings: No erythema or rash.    Neurological:      Mental Status: He is alert and oriented to person, place, and time.   Psychiatric:         Mood and Affect: Mood normal.         Behavior: Behavior normal.           ASSESSMENT:  Serafin Salgado is a 64 y.o. male with BCR after prostatectomy for prostate cancer, initially GG4 iPSA 21.74 pT3aN0 +focal WILLIS, +PNI, +margins (right and left anterior), surgery 3/2023, post-op PSA undetectable with subsequent rise to 0.22. DECIPHER intermediate risk, 0.48. Patient opted against ADT. S/p prostate bed RT 66 Gy in 33 fx completed 6/2024. Subsequent PSA rise to 0.35 post-RT.    PLAN:   He has recovered appropriately from his prostate bed radiation course. His PSA continues to rise post-treatment.  Will recheck PSA in 3 months.  If PSA continues to rise, will plan for a PSMA PET scan once PSA rises >0.5 ng/ml.  We broadly discussed management options for a pelvic sanjeev recurrence versus distant progression.    NCCN Guidelines were applicable to guide this patients treatment plan. Effort is required for continued longitudinal patient care.    Thank you for allowing me to participate in this patient's care.    Jluis Cooper MD  Department of Radiation Oncology  [facility]    Portions of this note were generated using voice recognition software, and may be subject to transcription errors.

## 2024-11-25 ENCOUNTER — OFFICE VISIT (OUTPATIENT)
Dept: ORTHOPEDIC SURGERY | Facility: CLINIC | Age: 64
End: 2024-11-25
Payer: COMMERCIAL

## 2024-11-25 DIAGNOSIS — M79.641 RIGHT HAND PAIN: Primary | ICD-10-CM

## 2024-11-25 DIAGNOSIS — G56.01 CARPAL TUNNEL SYNDROME ON RIGHT: ICD-10-CM

## 2024-11-25 PROCEDURE — 20526 THER INJECTION CARP TUNNEL: CPT | Mod: RT | Performed by: ORTHOPAEDIC SURGERY

## 2024-11-25 PROCEDURE — 2500000004 HC RX 250 GENERAL PHARMACY W/ HCPCS (ALT 636 FOR OP/ED): Performed by: ORTHOPAEDIC SURGERY

## 2024-11-25 PROCEDURE — 99213 OFFICE O/P EST LOW 20 MIN: CPT | Performed by: ORTHOPAEDIC SURGERY

## 2024-11-25 PROCEDURE — 99213 OFFICE O/P EST LOW 20 MIN: CPT | Mod: 25 | Performed by: ORTHOPAEDIC SURGERY

## 2024-11-25 RX ORDER — LIDOCAINE HYDROCHLORIDE 20 MG/ML
1 INJECTION, SOLUTION INFILTRATION; PERINEURAL
Status: COMPLETED | OUTPATIENT
Start: 2024-11-25 | End: 2024-11-25

## 2024-11-25 RX ORDER — TRIAMCINOLONE ACETONIDE 40 MG/ML
40 INJECTION, SUSPENSION INTRA-ARTICULAR; INTRAMUSCULAR
Status: COMPLETED | OUTPATIENT
Start: 2024-11-25 | End: 2024-11-25

## 2024-11-25 NOTE — PROGRESS NOTES
Subjective    Patient ID: Serafin Salgado is a 64 y.o. male.    Chief Complaint: OTHER (RT HAND INDEX AND MIDDLE FINGER PAIN SWELLING AND LOCKING/NKI)     Last Surgery: No surgery found  Last Surgery Date: No surgery found    HPI  The patient is a 64-year-old right-hand-dominant male who comes in with a complaint of right hand swelling, pain and numbness.  The symptoms tend to affect his thumb, index and middle fingers.  He is a diabetic.  He denies any history of gout.  He currently denies any trauma to his right hand.    Objective   Ortho Exam  The patient is in no acute distress.  Exam of his right hand and wrist reveals the skin envelope is intact.  There is no thenar intrinsic atrophy.  He has a negative Tinel's test but a positive carpal tunnel compression test and a positive Phalen test.  He has difficulty forming a tight fist with his index and middle finger secondary to swelling.      Assessment/Plan   Encounter Diagnoses:  Right hand pain    Carpal tunnel syndrome on right    Has symptoms consistent with carpal tunnel syndrome.  We discussed treatment versus further workup.  He has elected undergo a Kenalog injection.    Hand / UE Inj/Asp: R carpal tunnel for carpal tunnel syndrome on 11/25/2024 2:46 PM  Indications: pain  Details: 25 G needle, volar approach  Medications: 40 mg triamcinolone acetonide 40 mg/mL; 1 mL lidocaine 20 mg/mL (2 %)  Outcome: tolerated well, no immediate complications  Procedure, treatment alternatives, risks and benefits explained, specific risks discussed. Consent was given by the patient. Immediately prior to procedure a time out was called to verify the correct patient, procedure, equipment, support staff and site/side marked as required. Patient was prepped and draped in the usual sterile fashion.       The patient was informed to takes about a week for the injections have an effect.  He otherwise may use his right hand is much as he can tolerate.  If the injections do not provide  enough relief, I explained to the patient he likely will undergo an EMG.  He will follow-up after the EMG.

## 2024-12-30 ENCOUNTER — LAB (OUTPATIENT)
Dept: LAB | Facility: LAB | Age: 64
End: 2024-12-30
Payer: COMMERCIAL

## 2024-12-30 DIAGNOSIS — E11.9 TYPE 2 DIABETES MELLITUS WITHOUT COMPLICATIONS (MULTI): Primary | ICD-10-CM

## 2024-12-30 DIAGNOSIS — C61 PROSTATE CANCER (MULTI): ICD-10-CM

## 2024-12-30 DIAGNOSIS — E78.5 HYPERLIPIDEMIA, UNSPECIFIED: ICD-10-CM

## 2024-12-30 LAB
ALBUMIN SERPL BCP-MCNC: 4.2 G/DL (ref 3.4–5)
ALP SERPL-CCNC: 61 U/L (ref 33–136)
ALT SERPL W P-5'-P-CCNC: 10 U/L (ref 10–52)
ANION GAP SERPL CALC-SCNC: 15 MMOL/L (ref 10–20)
AST SERPL W P-5'-P-CCNC: 12 U/L (ref 9–39)
BILIRUB SERPL-MCNC: 0.7 MG/DL (ref 0–1.2)
BUN SERPL-MCNC: 16 MG/DL (ref 6–23)
CALCIUM SERPL-MCNC: 9.5 MG/DL (ref 8.6–10.3)
CHLORIDE SERPL-SCNC: 99 MMOL/L (ref 98–107)
CHOLEST SERPL-MCNC: 180 MG/DL (ref 0–199)
CHOLESTEROL/HDL RATIO: 5.1
CO2 SERPL-SCNC: 33 MMOL/L (ref 21–32)
CREAT SERPL-MCNC: 0.84 MG/DL (ref 0.5–1.3)
EGFRCR SERPLBLD CKD-EPI 2021: >90 ML/MIN/1.73M*2
GLUCOSE SERPL-MCNC: 142 MG/DL (ref 74–99)
HDLC SERPL-MCNC: 35.5 MG/DL
LDLC SERPL CALC-MCNC: 83 MG/DL
NON HDL CHOLESTEROL: 145 MG/DL (ref 0–149)
POTASSIUM SERPL-SCNC: 4.5 MMOL/L (ref 3.5–5.3)
PROT SERPL-MCNC: 6.8 G/DL (ref 6.4–8.2)
PSA SERPL-MCNC: 0.4 NG/ML
SODIUM SERPL-SCNC: 142 MMOL/L (ref 136–145)
TRIGL SERPL-MCNC: 310 MG/DL (ref 0–149)
VLDL: 62 MG/DL (ref 0–40)

## 2024-12-30 PROCEDURE — 83036 HEMOGLOBIN GLYCOSYLATED A1C: CPT

## 2024-12-30 PROCEDURE — 80053 COMPREHEN METABOLIC PANEL: CPT

## 2024-12-30 PROCEDURE — 80061 LIPID PANEL: CPT

## 2024-12-30 PROCEDURE — 84153 ASSAY OF PSA TOTAL: CPT

## 2024-12-31 LAB
EST. AVERAGE GLUCOSE BLD GHB EST-MCNC: 148 MG/DL
HBA1C MFR BLD: 6.8 %

## 2025-01-06 ENCOUNTER — HOSPITAL ENCOUNTER (OUTPATIENT)
Dept: RADIATION ONCOLOGY | Facility: CLINIC | Age: 65
Setting detail: RADIATION/ONCOLOGY SERIES
Discharge: HOME | End: 2025-01-06
Payer: COMMERCIAL

## 2025-01-06 VITALS
WEIGHT: 219.6 LBS | HEART RATE: 85 BPM | DIASTOLIC BLOOD PRESSURE: 70 MMHG | OXYGEN SATURATION: 96 % | TEMPERATURE: 96.8 F | RESPIRATION RATE: 18 BRPM | SYSTOLIC BLOOD PRESSURE: 147 MMHG | BODY MASS INDEX: 29.78 KG/M2

## 2025-01-06 DIAGNOSIS — C61 PROSTATE CANCER (MULTI): Primary | ICD-10-CM

## 2025-01-06 PROCEDURE — 99213 OFFICE O/P EST LOW 20 MIN: CPT | Performed by: STUDENT IN AN ORGANIZED HEALTH CARE EDUCATION/TRAINING PROGRAM

## 2025-01-06 ASSESSMENT — ENCOUNTER SYMPTOMS
RESPIRATORY NEGATIVE: 1
ENDOCRINE NEGATIVE: 1
CARDIOVASCULAR NEGATIVE: 1
CONSTITUTIONAL NEGATIVE: 1
BACK PAIN: 1
GASTROINTESTINAL NEGATIVE: 1
PSYCHIATRIC NEGATIVE: 1
EYES NEGATIVE: 1
HEMATOLOGIC/LYMPHATIC NEGATIVE: 1
NEUROLOGICAL NEGATIVE: 1

## 2025-01-06 ASSESSMENT — PAIN SCALES - GENERAL: PAINLEVEL_OUTOF10: 0-NO PAIN

## 2025-01-06 NOTE — PROGRESS NOTES
Radiation Oncology Nursing Note    Pain: The patient's current pain level was assessed.  They report currently having a pain of 0 out of 10.  They feel their pain is under control without the use of pain medications.    Review of Systems:  Review of Systems   Constitutional: Negative.    HENT:  Negative.     Eyes: Negative.    Respiratory: Negative.     Cardiovascular: Negative.    Gastrointestinal: Negative.    Endocrine: Negative.    Genitourinary: Negative.     Musculoskeletal:  Positive for back pain.   Skin: Negative.    Neurological: Negative.    Hematological: Negative.    Psychiatric/Behavioral: Negative.

## 2025-01-06 NOTE — PROGRESS NOTES
Radiation Oncology Follow-Up    Patient Name:  Serafin Salgado  MRN:  [id number]  :  1960    Primary Care Provider: No primary care provider on file.  Care Team: Patient Care Team:  Jluis Cooper MD as Radiation Oncologist (Radiation Oncology)    Date of Service: 2025     SUBJECTIVE  History of Present Illness:   Serafin Salgado is a 64 y.o. male with BCR after prostatectomy for prostate cancer, initially GG4 iPSA 21.74 pT3aN0 +focal WILLIS, +PNI, +margins (right and left anterior), surgery 3/2023, post-op PSA undetectable with subsequent rise to 0.22. DECIPHER intermediate risk, 0.48. S/p prostate bed RT 66 Gy in 33 fx completed 2024. Patient opted against ADT. PSA continued to rise after radiotherapy, to 0.35 (2024). Now at 0.40 ng/ml on most recent check on 24.   The patient endorses baseline urinary and bowel habits.  No hematuria, dysuria, or hematochezia.  He denies acute bone pains.    Treatment Rendered:   IMRT: Prostate bed (Resolved)    Treatment Period Technique Fraction Dose Fractions Total Dose   Course 1 2024-2024  (days elapsed: 47)         ProstBed 2024-2024 VMAT 200 / 200 cGy  6600 / 6,600 cGy       Review of Systems:   Review of Systems - Oncology  - please see nursing note    Performance Status:   The Karnofsky performance scale today is 90, Able to carry on normal activity; minor signs or symptoms of disease (ECOG equivalent 0).      OBJECTIVE  /70   Pulse 85   Temp 36 °C (96.8 °F)   Resp 18   Wt 99.6 kg (219 lb 9.6 oz)   SpO2 96%   BMI 29.78 kg/m²    Physical Exam  Vitals reviewed.   Constitutional:       General: He is not in acute distress.  HENT:      Head: Normocephalic and atraumatic.   Pulmonary:      Effort: Pulmonary effort is normal. No respiratory distress.   Abdominal:      General: There is no distension.   Skin:     Findings: No erythema or rash.   Neurological:      Mental Status: He is alert and oriented to person, place, and time.    Psychiatric:         Mood and Affect: Mood normal.         Behavior: Behavior normal.           ASSESSMENT:  Serafin Salgado is a 64 y.o. male with BCR after prostatectomy for prostate cancer, initially GG4 iPSA 21.74 pT3aN0 +focal WILLIS, +PNI, +margins (right and left anterior), surgery 3/2023, post-op PSA undetectable with subsequent rise to 0.22. DECIPHER intermediate risk, 0.48. S/p prostate bed RT 66 Gy in 33 fx completed 6/2024. Patient opted against ADT. PSA continued to rise after radiotherapy, now at 0.40 ng/ml.    PLAN:   PSA continues to rise after salvage prostate bed RT, though rising slowly.   I recommend continued PSA monitoring.  Will plan to obtain a PSMA PET scan once PSA rises above 0.50.  I will follow-up with the patient in 3 months with repeat PSA.    NCCN Guidelines were applicable to guide this patients treatment plan. Effort is required for continued longitudinal patient care.    Thank you for allowing me to participate in this patient's care.    Jluis Cooper MD  Department of Radiation Oncology  [facility]    Portions of this note were generated using voice recognition software, and may be subject to transcription errors.

## 2025-04-09 ENCOUNTER — LAB (OUTPATIENT)
Dept: LAB | Facility: HOSPITAL | Age: 65
End: 2025-04-09
Payer: COMMERCIAL

## 2025-04-09 DIAGNOSIS — C61 PROSTATE CANCER (MULTI): ICD-10-CM

## 2025-04-09 DIAGNOSIS — C61 MALIGNANT NEOPLASM OF PROSTATE (MULTI): Primary | ICD-10-CM

## 2025-04-09 LAB — PSA SERPL-MCNC: 0.89 NG/ML

## 2025-04-09 PROCEDURE — 84153 ASSAY OF PSA TOTAL: CPT

## 2025-04-11 ENCOUNTER — HOSPITAL ENCOUNTER (OUTPATIENT)
Dept: RADIATION ONCOLOGY | Facility: CLINIC | Age: 65
Setting detail: RADIATION/ONCOLOGY SERIES
Discharge: HOME | End: 2025-04-11
Payer: COMMERCIAL

## 2025-04-11 VITALS
WEIGHT: 223.8 LBS | DIASTOLIC BLOOD PRESSURE: 78 MMHG | HEART RATE: 88 BPM | SYSTOLIC BLOOD PRESSURE: 157 MMHG | RESPIRATION RATE: 18 BRPM | OXYGEN SATURATION: 96 % | TEMPERATURE: 97.9 F | BODY MASS INDEX: 30.35 KG/M2

## 2025-04-11 DIAGNOSIS — C61 PROSTATE CANCER (MULTI): Primary | ICD-10-CM

## 2025-04-11 DIAGNOSIS — R97.21 RISING PSA FOLLOWING TREATMENT FOR MALIGNANT NEOPLASM OF PROSTATE: ICD-10-CM

## 2025-04-11 PROCEDURE — G2211 COMPLEX E/M VISIT ADD ON: HCPCS | Performed by: STUDENT IN AN ORGANIZED HEALTH CARE EDUCATION/TRAINING PROGRAM

## 2025-04-11 PROCEDURE — 99213 OFFICE O/P EST LOW 20 MIN: CPT | Performed by: STUDENT IN AN ORGANIZED HEALTH CARE EDUCATION/TRAINING PROGRAM

## 2025-04-11 ASSESSMENT — ENCOUNTER SYMPTOMS
EYES NEGATIVE: 1
HEMATOLOGIC/LYMPHATIC NEGATIVE: 1
GASTROINTESTINAL NEGATIVE: 1
ENDOCRINE NEGATIVE: 1
ARTHRALGIAS: 1
FATIGUE: 1
HEADACHES: 1
PSYCHIATRIC NEGATIVE: 1
RESPIRATORY NEGATIVE: 1
CARDIOVASCULAR NEGATIVE: 1

## 2025-04-11 ASSESSMENT — PATIENT HEALTH QUESTIONNAIRE - PHQ9
2. FEELING DOWN, DEPRESSED OR HOPELESS: NOT AT ALL
1. LITTLE INTEREST OR PLEASURE IN DOING THINGS: NOT AT ALL
SUM OF ALL RESPONSES TO PHQ9 QUESTIONS 1 AND 2: 0

## 2025-04-11 ASSESSMENT — COLUMBIA-SUICIDE SEVERITY RATING SCALE - C-SSRS
2. HAVE YOU ACTUALLY HAD ANY THOUGHTS OF KILLING YOURSELF?: NO
1. IN THE PAST MONTH, HAVE YOU WISHED YOU WERE DEAD OR WISHED YOU COULD GO TO SLEEP AND NOT WAKE UP?: NO
6. HAVE YOU EVER DONE ANYTHING, STARTED TO DO ANYTHING, OR PREPARED TO DO ANYTHING TO END YOUR LIFE?: NO

## 2025-04-11 ASSESSMENT — PAIN SCALES - GENERAL: PAINLEVEL_OUTOF10: 0-NO PAIN

## 2025-04-11 NOTE — ADDENDUM NOTE
Encounter addended by: Rufino Silva RN on: 4/11/2025 3:37 PM   Actions taken: Order list changed, Diagnosis association updated

## 2025-04-11 NOTE — PROGRESS NOTES
Radiation Oncology Follow-Up    Patient Name:  Serafin Salgado  MRN:  [id number]  :  1960    Primary Care Provider: No primary care provider on file.  Care Team: Patient Care Team:  Jluis Cooper MD as Radiation Oncologist (Radiation Oncology)    Date of Service: 2025     SUBJECTIVE  History of Present Illness:   Serafin Salgado is a 64 y.o. male with BCR after prostatectomy for prostate cancer, initially GG4 iPSA 21.74 pT3aN0 +focal WILLIS, +PNI, +margins (right and left anterior), surgery 3/2023, post-op PSA undetectable with subsequent rise to 0.22. DECIPHER intermediate risk, 0.48. S/p prostate bed RT 66 Gy in 33 fx completed 2024. Patient opted against ADT. PSA continued to rise after radiotherapy, rising to 0.89 in 2025.   He returns today for follow-up to discuss his most recent PSA.  He states he is urinating well without issues with frequency, dysuria, leakage.  No hematuria.  Bowel habits are at his baseline without hematochezia.  He has chronic back and shoulder pains, but denies any new bone pains that are out of usual for him.       Treatment Rendered:   IMRT: Prostate bed (Resolved)    Treatment Period Technique Fraction Dose Fractions Total Dose   Course 1 2024-2024  (days elapsed: 47)         ProstBed 2024-2024 VMAT 200 / 200 cGy  6600 / 6,600 cGy       Review of Systems:   Review of Systems - Oncology  - please see nursing note    Performance Status:   The Karnofsky performance scale today is 90, Able to carry on normal activity; minor signs or symptoms of disease (ECOG equivalent 0).      OBJECTIVE  /78   Pulse 88   Temp 36.6 °C (97.9 °F)   Resp 18   Wt 102 kg (223 lb 12.8 oz)   SpO2 96%   BMI 30.35 kg/m²    Physical Exam  Vitals reviewed.   Constitutional:       General: He is not in acute distress.  HENT:      Head: Normocephalic and atraumatic.   Pulmonary:      Effort: Pulmonary effort is normal. No respiratory distress.   Abdominal:      General: There  is no distension.   Skin:     Findings: No rash.   Neurological:      Mental Status: He is alert and oriented to person, place, and time.   Psychiatric:         Mood and Affect: Mood normal.         Behavior: Behavior normal.           ASSESSMENT:  Serafin Salgado is a 64 y.o. male with BCR after prostatectomy for prostate cancer, initially GG4 iPSA 21.74 pT3aN0 +focal WILLIS, +PNI, +margins (right and left anterior), surgery 3/2023, post-op PSA undetectable with subsequent rise to 0.22. DECIPHER intermediate risk, 0.48. S/p prostate bed RT 66 Gy in 33 fx completed 6/2024. Patient opted against ADT. PSA continued to rise after radiotherapy, rising to 0.89 in 4/2025.    PLAN:   We discussed his most recent PSA.  I would recommend a PSMA PET scan at this point to determine the location of prostate cancer recurrence.  We discussed that his potential treatment plan would be contingent on the PET scan findings.  There may be roles for antihormonal therapy and/or radiotherapy depending on the results.  He will follow-up with me soon after the PET scan is complete.    NCCN Guidelines were applicable to guide this patients treatment plan. Effort is required for continued longitudinal patient care.    Thank you for allowing me to participate in this patient's care.    Jluis Cooper MD  Department of Radiation Oncology  [facility]    Portions of this note were generated using voice recognition software, and may be subject to transcription errors.

## 2025-04-11 NOTE — PROGRESS NOTES
Radiation Oncology Nursing Note    Pain: The patient's current pain level was assessed.  They report currently having a pain of 0 out of 10.  They feel their pain is under control without the use of pain medications.    Review of Systems:  Review of Systems   Constitutional:  Positive for fatigue.   HENT:  Negative.     Eyes: Negative.    Respiratory: Negative.     Cardiovascular: Negative.    Gastrointestinal: Negative.    Endocrine: Negative.    Genitourinary: Negative.     Musculoskeletal:  Positive for arthralgias.   Skin: Negative.    Neurological:  Positive for headaches.   Hematological: Negative.    Psychiatric/Behavioral: Negative.

## 2025-04-15 ENCOUNTER — APPOINTMENT (OUTPATIENT)
Dept: RADIOLOGY | Facility: CLINIC | Age: 65
End: 2025-04-15
Payer: COMMERCIAL

## 2025-04-15 ENCOUNTER — TELEPHONE (OUTPATIENT)
Dept: RADIATION ONCOLOGY | Facility: CLINIC | Age: 65
End: 2025-04-15
Payer: COMMERCIAL

## 2025-04-15 ENCOUNTER — HOSPITAL ENCOUNTER (OUTPATIENT)
Dept: RADIOLOGY | Facility: CLINIC | Age: 65
Discharge: HOME | End: 2025-04-15
Payer: COMMERCIAL

## 2025-04-15 DIAGNOSIS — C61 PROSTATE CANCER (MULTI): Primary | ICD-10-CM

## 2025-04-15 RX ORDER — DIAZEPAM 5 MG/1
5 TABLET ORAL ONCE
Qty: 1 TABLET | Refills: 0 | Status: SHIPPED | OUTPATIENT
Start: 2025-04-15 | End: 2025-04-15

## 2025-04-15 NOTE — TELEPHONE ENCOUNTER
This patient reached out and let our department know that he was unable to get his PET today due to timing issues with the scanner and the contrast today. He took his anti-anxiety medication today in order to do the scan, but will now need another dose for the rescheduled PET this Friday. He also wanted to ensure there would be enough time between Friday and his FUV on 4/21 with Dr. Cooper to get results back. MD made aware.

## 2025-04-16 ENCOUNTER — TELEPHONE (OUTPATIENT)
Facility: CLINIC | Age: 65
End: 2025-04-16
Payer: COMMERCIAL

## 2025-04-16 DIAGNOSIS — K11.8 PAROTID MASS: ICD-10-CM

## 2025-04-16 NOTE — TELEPHONE ENCOUNTER
Spoke with Mr. Salgado and provided appt details for his upcoming CT scan at [facility] on 4/29. He is awre he will need updated labs and that he will need to follow a clear liqiud prep 3hrs prior to the scan. I also rescheduled his follow up with Dr. Gomez. No other questions or concerns.

## 2025-04-17 ENCOUNTER — APPOINTMENT (OUTPATIENT)
Facility: CLINIC | Age: 65
End: 2025-04-17
Payer: COMMERCIAL

## 2025-04-18 ENCOUNTER — HOSPITAL ENCOUNTER (OUTPATIENT)
Dept: RADIOLOGY | Facility: CLINIC | Age: 65
Discharge: HOME | End: 2025-04-18
Payer: COMMERCIAL

## 2025-04-18 PROCEDURE — 3430000001 HC RX 343 DIAGNOSTIC RADIOPHARMACEUTICALS: Performed by: STUDENT IN AN ORGANIZED HEALTH CARE EDUCATION/TRAINING PROGRAM

## 2025-04-18 PROCEDURE — 78815 PET IMAGE W/CT SKULL-THIGH: CPT | Mod: PI

## 2025-04-18 PROCEDURE — A9596 HC RX 343 DIAGNOSTIC RADIOPHARMACEUTICALS: HCPCS | Performed by: STUDENT IN AN ORGANIZED HEALTH CARE EDUCATION/TRAINING PROGRAM

## 2025-04-18 RX ADMIN — KIT FOR THE PREPARATION OF GALLIUM GA 68 GOZETOTIDE INJECTION 5.33 MILLICURIE: KIT INTRAVENOUS at 08:08

## 2025-04-21 ENCOUNTER — HOSPITAL ENCOUNTER (OUTPATIENT)
Dept: RADIATION ONCOLOGY | Facility: CLINIC | Age: 65
Setting detail: RADIATION/ONCOLOGY SERIES
Discharge: HOME | End: 2025-04-21
Payer: COMMERCIAL

## 2025-04-21 VITALS
HEART RATE: 82 BPM | WEIGHT: 222 LBS | BODY MASS INDEX: 30.11 KG/M2 | TEMPERATURE: 97.3 F | OXYGEN SATURATION: 94 % | SYSTOLIC BLOOD PRESSURE: 161 MMHG | RESPIRATION RATE: 18 BRPM | DIASTOLIC BLOOD PRESSURE: 80 MMHG

## 2025-04-21 DIAGNOSIS — C61 PROSTATE CANCER (MULTI): Primary | ICD-10-CM

## 2025-04-21 PROCEDURE — G2211 COMPLEX E/M VISIT ADD ON: HCPCS | Performed by: STUDENT IN AN ORGANIZED HEALTH CARE EDUCATION/TRAINING PROGRAM

## 2025-04-21 PROCEDURE — 99214 OFFICE O/P EST MOD 30 MIN: CPT | Performed by: STUDENT IN AN ORGANIZED HEALTH CARE EDUCATION/TRAINING PROGRAM

## 2025-04-21 ASSESSMENT — ENCOUNTER SYMPTOMS
RESPIRATORY NEGATIVE: 1
ENDOCRINE NEGATIVE: 1
PSYCHIATRIC NEGATIVE: 1
NEUROLOGICAL NEGATIVE: 1
HEMATOLOGIC/LYMPHATIC NEGATIVE: 1
EYES NEGATIVE: 1
GASTROINTESTINAL NEGATIVE: 1
CARDIOVASCULAR NEGATIVE: 1
ARTHRALGIAS: 1
FATIGUE: 1

## 2025-04-21 ASSESSMENT — PAIN SCALES - GENERAL: PAINLEVEL_OUTOF10: 0-NO PAIN

## 2025-04-21 NOTE — PROGRESS NOTES
Radiation Oncology Follow-Up    Patient Name:  Serafin Salgado  MRN:  [id number]  :  1960    Primary Care Provider: No Assigned PCP Generic Provider, MD  Care Team: Patient Care Team:  No Assigned Pcp Generic Provider, MD as PCP - General (General Practice)  Jluis Cooper MD as Radiation Oncologist (Radiation Oncology)    Date of Service: 2025     SUBJECTIVE  History of Present Illness:   Serafin Salgado is a 64 y.o. male with BCR after prostatectomy for prostate cancer, initially GG4 iPSA 21.74 pT3aN0 +focal WILLIS, +PNI, +margins (right and left anterior), surgery 3/2023, post-op PSA undetectable with subsequent rise to 0.22. DECIPHER intermediate risk, 0.48. S/p prostate bed RT 66 Gy in 33 fx completed 2024. Patient opted against ADT. PSA continued to rise after radiotherapy, rising to 0.89 in 2025.  This prompted PSMA PET scan and follow-up visit today. PSMA PET on 25 showed no evidence of local recurrence in the prostate bed, and no evidence of sanjeev or distant metastases.   No changes in urinary or bowel habits since last visit.    Treatment Rendered:   IMRT: Prostate bed (Resolved)    Treatment Period Technique Fraction Dose Fractions Total Dose   Course 1 2024-2024  (days elapsed: 47)         ProstBed 2024-2024 VMAT 200 / 200 cGy  6,600 / 6,600 cGy       Review of Systems:   Review of Systems - Oncology  - please see nursing note    Performance Status:   The Karnofsky performance scale today is 90, Able to carry on normal activity; minor signs or symptoms of disease (ECOG equivalent 0).      OBJECTIVE  /80   Pulse 82   Temp 36.3 °C (97.3 °F)   Resp 18   Wt 101 kg (222 lb)   SpO2 94%   BMI 30.11 kg/m²    Physical Exam  Vitals reviewed.   Constitutional:       General: He is not in acute distress.  HENT:      Head: Normocephalic and atraumatic.   Pulmonary:      Effort: Pulmonary effort is normal. No respiratory distress.   Abdominal:      General: There is no  distension.   Skin:     Findings: No rash.   Neurological:      Mental Status: He is alert and oriented to person, place, and time.   Psychiatric:         Mood and Affect: Mood normal.         Behavior: Behavior normal.           ASSESSMENT:  Serafin Salgado is a 64 y.o. male with BCR after prostatectomy for prostate cancer, initially GG4 iPSA 21.74 pT3aN0 +focal WILLIS, +PNI, +margins (right and left anterior), surgery 3/2023, post-op PSA undetectable with subsequent rise to 0.22. DECIPHER intermediate risk, 0.48. S/p prostate bed RT 66 Gy in 33 fx completed 6/2024. Patient opted against ADT. PSA continued to rise after radiotherapy, rising to 0.89 in 4/2025. PSMA PET on 4/18/25 showed no evidence of local recurrence in the prostate bed, and no evidence of sanjeev or distant metastases.    PLAN:   Discussed PSMA results with the patient and his spouse. Despite his current PSA level, the location of his recurrence was not identified.   Discussed options at length. I would favor continued PSA surveillance in 3 months, and repeat PSMA PET in the intermediate future to attempt to identify the site of recurrence.  The alternative option for management would be consideration of ADT at this time, however in doing so we may lose the ability to detect the site of his recurrence on future PSMA PET, therefore potentially losing the ability to use aggressive local therapy such as radiotherapy to salvage his recurrence.  He is comfortable with the plan for continued PSA surveillance, however he also has interest in discussing his drug therapy options with medical oncology.  Will send a referral to  oncology  for an opinion. He will follow with me in 3 months.    NCCN Guidelines were applicable to guide this patients treatment plan. Effort is required for continued longitudinal patient care.    Thank you for allowing me to participate in this patient's care.    Jluis Cooper MD  Department of Radiation Oncology  [facility]    Portions of this note were generated using voice recognition software, and may be subject to transcription errors.

## 2025-04-24 ENCOUNTER — OFFICE VISIT (OUTPATIENT)
Dept: HEMATOLOGY/ONCOLOGY | Facility: CLINIC | Age: 65
End: 2025-04-24
Payer: COMMERCIAL

## 2025-04-24 ENCOUNTER — PATIENT OUTREACH (OUTPATIENT)
Dept: HEMATOLOGY/ONCOLOGY | Facility: CLINIC | Age: 65
End: 2025-04-24
Payer: COMMERCIAL

## 2025-04-24 VITALS
HEART RATE: 104 BPM | TEMPERATURE: 96.6 F | DIASTOLIC BLOOD PRESSURE: 77 MMHG | OXYGEN SATURATION: 95 % | HEIGHT: 71 IN | WEIGHT: 221.9 LBS | RESPIRATION RATE: 18 BRPM | BODY MASS INDEX: 31.06 KG/M2 | SYSTOLIC BLOOD PRESSURE: 135 MMHG

## 2025-04-24 DIAGNOSIS — C61 PROSTATE CANCER (MULTI): Primary | ICD-10-CM

## 2025-04-24 PROCEDURE — 3008F BODY MASS INDEX DOCD: CPT | Performed by: STUDENT IN AN ORGANIZED HEALTH CARE EDUCATION/TRAINING PROGRAM

## 2025-04-24 PROCEDURE — 99205 OFFICE O/P NEW HI 60 MIN: CPT | Performed by: STUDENT IN AN ORGANIZED HEALTH CARE EDUCATION/TRAINING PROGRAM

## 2025-04-24 PROCEDURE — 99215 OFFICE O/P EST HI 40 MIN: CPT | Performed by: STUDENT IN AN ORGANIZED HEALTH CARE EDUCATION/TRAINING PROGRAM

## 2025-04-24 ASSESSMENT — PATIENT HEALTH QUESTIONNAIRE - PHQ9
1. LITTLE INTEREST OR PLEASURE IN DOING THINGS: NOT AT ALL
SUM OF ALL RESPONSES TO PHQ9 QUESTIONS 1 AND 2: 0
2. FEELING DOWN, DEPRESSED OR HOPELESS: NOT AT ALL

## 2025-04-24 ASSESSMENT — COLUMBIA-SUICIDE SEVERITY RATING SCALE - C-SSRS
1. IN THE PAST MONTH, HAVE YOU WISHED YOU WERE DEAD OR WISHED YOU COULD GO TO SLEEP AND NOT WAKE UP?: NO
2. HAVE YOU ACTUALLY HAD ANY THOUGHTS OF KILLING YOURSELF?: NO
6. HAVE YOU EVER DONE ANYTHING, STARTED TO DO ANYTHING, OR PREPARED TO DO ANYTHING TO END YOUR LIFE?: NO

## 2025-04-24 ASSESSMENT — PAIN SCALES - GENERAL: PAINLEVEL_OUTOF10: 0-NO PAIN

## 2025-04-27 NOTE — PROGRESS NOTES
" Oncology New Patient Visit    Patient ID: Serafin Salgado is a 64 y.o. male who presents today to [facility].  Referring Physician: Jluis Cooper MD  [id number] [phone]  Primary Care Provider: No Assigned PCP Generic Provider, MD    Patient Timeline    Date  Event    11/28/22 PSA 21.74    1/11/23 Prostate biopsy - Mane GG4 prostate cancer    3/16/23 Radical prostatectomy - Mane GG4 cancer with EPE, positive R anterior or L anterior margins    4/27/23 PSA < 0.1    7/25/23 PSA < 0.1    1/24/24 PSA 0.18    3/18/24 PSA 0.22    5/9/24  Start salvage radiation to prostate bed, 6600 cGy/33fx    9/23/25 PSA increased to 0.35    4/9/25  PSA 0.89    4/18/25 PSMA PET negative for any metastatic sites of disease    Cancer Staging   Prostate cancer (Multi)  Staging form: Prostate, AJCC 8th Edition  - Pathologic stage from 3/8/2024: Stage IIIB (pT3a, pN0, cM0, PSA: 21.7, Grade Group: 4) - Signed by Jluis Cooper MD on 3/10/2024    \A [facility] [place] [facility]\""    Serafin Salgado presents accompanied by his wife. He feels well. He has no pain. Appetite and energy level are good. He has no urinary issues, including no dysuria, hematuria, or incontinence. He reports no fevers, chills, night sweats, dyspnea, chest pain, abdominal pain, nausea, vomiting, diarrhea, constipation, extremity weakness, neuropathy, skin changes/rash, easy bleeding/bruising, vision changes, or headaches.    ROS    A 14 point review of systems was performed and is negative unless otherwise stated in the [facility]    Medical History[1]     Medications    Current Outpatient Medications   Medication Instructions    atorvastatin (Lipitor) 40 mg tablet Take by mouth.    BD Ultra-Fine Mini Pen Needle 31 gauge x 3/16\" needle     cholecalciferol (Vitamin D-3) 50,000 unit capsule 1 capsule, Every 30 days    cyclobenzaprine (Flexeril) 10 mg tablet 1 tablet, Nightly PRN    diazePAM (VALIUM) 5 mg, oral, Once, Take prior to PET scan    glimepiride (Amaryl) 4 mg tablet 1 " "tablet, Daily    HumaLOG KwikPen Insulin 100 unit/mL injection     metFORMIN (Glucophage) 500 mg tablet 1 tablet, 2 times daily    nitroglycerin (Nitrostat) 0.4 mg SL tablet DISSOLVE 1 TABLET UNDER TONGUE EVERY 5 MINUTES FOR 3 DOSES AS NEEDED THEN ONLY THEN CONTACT EMS    omega-3 1,000 mg capsule capsule 1 capsule, 2 times daily    Plavix 75 mg tablet Take by mouth.    Semglee,insulin glarg-yfgn,Pen 100 unit/mL (3 mL) Pen         Fam Hx    Family History[2]    Social Hx    Works as an [employer] tech. 20 pack-year smoking history. No alcohol or recreational drug use.    Objective     Physical Examination    /77 (BP Location: Left arm, Patient Position: Sitting)   Pulse 104   Temp 35.9 °C (96.6 °F) (Temporal)   Resp 18   Ht (S) 1.816 m (5' 11.5\")   Wt 101 kg (221 lb 14.4 oz)   SpO2 95%   BMI 30.52 kg/m²   BSA: 2.26 meters squared    Performance Status:  Asymptomatic (ECO)    Physical Exam    GENERAL: Well appearing, in no apparent distress  HEENT: No cervical or supraclavicular adenopathy. Mucous membranes moist.  RESP: Normal work of breathing  ABD: Soft, nontender, nondistended.  EXT: Warm and well perfused, no edema  NEURO: A&O x 3, normal gait  SKIN: No visible rashes or bruising.  PSYCH: Normal affect.    Results    Labs  Lab Results   Component Value Date    WBC 7.7 2024    HGB 16.2 2024    HCT 49.7 2024    MCV 90 2024     2024      Lab Results   Component Value Date    GLUCOSE 142 (H) 2024    CALCIUM 9.5 2024     2024    K 4.5 2024    CO2 33 (H) 2024    CL 99 2024    BUN 16 2024    CREATININE 0.84 2024      Lab Results   Component Value Date    ALT 10 2024    AST 12 2024    ALKPHOS 61 2024    BILITOT 0.7 2024      Lab Results   Component Value Date    TSH 2.37 2024        Lab Results   Component Value Date    PSA 0.89 2025    PSA 0.40 2024    PSA 0.35 2024    "     PSA Trend    11/28/22 21.74  4/27/23 < 0.1  7/25/23 < 0.1  1/24/24 0.18  3/18/24 0.22  9/23/24 0.35  12/30/24 0.40  4/9/25  0.89    Images    Imaging reviewed in EHR and summarized below:    PSMA PET 4/18/25    1. Status post prostatectomy, no PET evidence of local recurrence in the postsurgical bed.  2. No PET evidence of sanjeev or distant metastasis.    Pathology    Radical prostatectomy 3/16/23    A. PROSTATE AND SEMINAL VESICLES AND ANTERIOR PROSTATE FAT, RADICAL  PROSTATECTOMY:    - PROSTATIC ADENOCARCINOMA, ACINAR TYPE, SUDARSHAN SCORE 8 (4+4), GRADE GROUP 4  - PROSTATIC ADENOCARCINOMA, DUCTAL TYPE, SUDARSHAN SCORE 8 (4+4), GRADE GROUP 4  - ADENOCARCINOMA PRESENT AT ANTERIOR MARGIN  - POSITIVE FOR EXTRAPROSTATIC EXTENSION AND PERINEURAL INVASION  - ONE LYMPH NODE, NEGATIVE FOR CARCINOMA (0/1)  - SEE COMPLETE CANCER STAGING SUMMARY    B. BILATERAL PELVIC LYMPH NODES, EXCISION:    - TWO LYMPH NODES, NEGATIVE FOR CARCINOMA (0/2)                                                                                            CASE SUMMARY REPORT       Procedure:      Radical prostatectomy     Prostate Size     Prostate Weight (Grams): 6.96 g     Prostate Greatest Dimension (Centimeters): 5.2 cm     Additional Prostate Dimension (Centimeters): 5.1 cm     Additional Prostate Dimension (Centimeters): 3.4 cm  TUMOR  Histologic Type:      Acinar adenocarcinoma       Ductal adenocarcinoma     Histologic Grade     Grade:      Grade group 4 ([place] Score 4 + 4 = 8)  Intraductal Carcinoma (IDC):      Not identified  Cribriform Glands:      Present  Treatment Effect:      No known presurgical therapy  TUMOR QUANTITATION   Estimated Percentage of Prostate Involved by Tumor:         31 - 40%  Extraprostatic Extension (EPE):      Present, focal  Location of Extraprostatic Extension:       Left postero-lateral (neurovascular  bundle)  Urinary Bladder Neck Invasion:      Not identified  Seminal Vesicle Invasion:      Not  identified  Lymphovascular Invasion:      Not Identified  Perineural Invasion:      Present  MARGINS  Margin Status:      Invasive carcinoma present at margin   Focality of Margin Involvement:        Multifocal   Margin(s) Involved by Invasive Carcinoma:        Right anterior         Left anterior  REGIONAL LYMPH NODES  Regional Lymph Node Status:        All regional lymph nodes negative for tumor   Number of Lymph Nodes Examined:         3  PATHOLOGIC STAGE CLASSIFICATION (pTNM, AJCC 8th Edition)  Primary Tumor (pT):      pT3a  pN Category:      pN0  ADDITIONAL FINDINGS  Additional Findings:      High-grade prostatic intraepithelial neoplasia (PIN)     Genomics    Somatic:  None  Germline: None    Assessment/Plan    Serafin Salgado is a 64 y.o. year old male initially diagnosed with high risk (cT3, iPSA 21.7, GG4) prostate cancer and underwent radical prostatectomy in March 2023. He developed biochemical recurrence and underwent salvage radiation in June 2024. PSA, however has continued to rise with doubling time < 6 months. PSMA PET was obtained and was negative.    We discussed implications of his biochemically recurrent prostate cancer. Given low PSA and lack of imaging findings, it would be reasonable to continue surveillance at this time. If PSA > 1, doubling time remains < 9 months, and no findings on PSMA PET, he would be a candidate for enzalutamide +/- ADT based on the EMBARK study. He will have PSA checked in 3 months and further decisions will be made at that time.    # Biochemically recurrent prostate cancer  - Follows with urology (Dr. Peguero); s/p prostatectomy  - Follows with radiation oncology (Dr. ESSIE Meraz); s/p salvage radiation  - Recheck PSA in 3 months  - Repeat PSMA PET if PSA > 1  - Would be candidate for enzalutamide +/- ADT once PSA > 1    The above plan was discussed with the patient and family, and they were in agreement. All questions were answered to their satisfaction.    RV 4 months with  labs (PSA, Testosterone)    More than 60 minutes were spent in face-to-face encounter, review of medical records, coordination of care, and documentation.         [1]   Past Medical History:  Diagnosis Date    CAD (coronary artery disease)     DM2 (diabetes mellitus, type 2) (Multi)     HLD (hyperlipidemia)     HTN (hypertension)     Osteoarthritis     Prostate cancer (Multi)    [2]   Family History  Problem Relation Name Age of Onset    Pancreatic cancer Father

## 2025-04-28 LAB
BUN SERPL-MCNC: 12 MG/DL (ref 7–25)
CREAT SERPL-MCNC: 0.66 MG/DL (ref 0.7–1.35)
EGFRCR SERPLBLD CKD-EPI 2021: 105 ML/MIN/1.73M2

## 2025-04-29 ENCOUNTER — HOSPITAL ENCOUNTER (OUTPATIENT)
Dept: RADIOLOGY | Facility: CLINIC | Age: 65
Discharge: HOME | End: 2025-04-29
Payer: COMMERCIAL

## 2025-04-29 DIAGNOSIS — K11.8 PAROTID MASS: ICD-10-CM

## 2025-04-29 PROCEDURE — 2550000001 HC RX 255 CONTRASTS: Performed by: OTOLARYNGOLOGY

## 2025-04-29 PROCEDURE — 70491 CT SOFT TISSUE NECK W/DYE: CPT | Performed by: RADIOLOGY

## 2025-04-29 PROCEDURE — 70491 CT SOFT TISSUE NECK W/DYE: CPT

## 2025-04-29 RX ADMIN — IOHEXOL 75 ML: 350 INJECTION, SOLUTION INTRAVENOUS at 15:18

## 2025-04-30 ENCOUNTER — APPOINTMENT (OUTPATIENT)
Facility: CLINIC | Age: 65
End: 2025-04-30
Payer: COMMERCIAL

## 2025-04-30 VITALS — HEIGHT: 72 IN | BODY MASS INDEX: 30.31 KG/M2 | WEIGHT: 223.8 LBS

## 2025-04-30 DIAGNOSIS — K11.8 PAROTID MASS: Primary | ICD-10-CM

## 2025-04-30 PROCEDURE — 99204 OFFICE O/P NEW MOD 45 MIN: CPT | Performed by: OTOLARYNGOLOGY

## 2025-04-30 PROCEDURE — 3008F BODY MASS INDEX DOCD: CPT | Performed by: OTOLARYNGOLOGY

## 2025-04-30 ASSESSMENT — PAIN SCALES - GENERAL: PAINLEVEL_OUTOF10: 0-NO PAIN

## 2025-04-30 NOTE — PROGRESS NOTES
ENT Outpatient Consultation    Chief Complaint: Right parotid mass  History Of Present Illness  Serafin Salgado is a 64 y.o. male presents for evaluation of right sided parotid mass.  He was seen previously by Dr. Yarbrough in 2020.  FNA was obtained at that time showing cystic contents.  There was a significant amount of fluid that was extracted at the time of the FNA.  He has noticed a slow increase in size since that time.  He had a CT scan that was reviewed showing an increase in the cystic portion of the mass.  There are no concerning lymph nodes.  He is relatively asymptomatic with the exception of some mild tenderness when pressing on it    He has history of prostate cancer     Past Medical History  He has a past medical history of CAD (coronary artery disease), DM2 (diabetes mellitus, type 2) (Multi), HLD (hyperlipidemia), HTN (hypertension), Osteoarthritis, and Prostate cancer (Multi).    Surgical History  He has a past surgical history that includes Prostatectomy and Coronary stent placement.     Social History  He reports that he has been smoking cigarettes. He has a 20 pack-year smoking history. He has been exposed to tobacco smoke. He has never used smokeless tobacco. He reports that he does not currently use drugs. He reports that he does not drink alcohol.    Family History  Family History[1]     Allergies  Gabapentin     Physical Exam:  CONSTITUTIONAL:  No acute distress  VOICE:  No hoarseness or other abnormality  RESPIRATION:  Breathing comfortably, no stridor  CV:  No clubbing/cyanosis/edema in hands  EYES:  EOM intact, sclera normal  NEURO:  Alert and oriented times 3, Cranial nerves II-XII grossly intact and symmetric bilaterally  HEAD AND FACE:  Symmetric facial features, no masses or lesions, sinuses non-tender to palpation  SALIVARY GLANDS: Palpable mass within the right parotid gland.  Lesion is not firm.  There are no overlying skin changes  EARS:  Normal external ears, external auditory canals, and  TMs to otoscopy, normal hearing to whispered voice.  NOSE:  External nose midline, anterior rhinoscopy is normal with limited visualization to the anterior aspect of the interior turbinates, no bleeding or drainage, no lesions  ORAL CAVITY/OROPHARYNX/LIPS:  Normal mucous membranes, normal floor of mouth/tongue/OP, no masses or lesions  PHARYNGEAL WALLS:  No masses or lesions  NECK/LYMPH:  No palpable LAD, no thyroid masses, trachea midline  SKIN:  Neck skin is without scar or injury  PSYCH:  Alert and oriented with appropriate mood and affect     Last Recorded Vitals  Height 1.829 m (6'), weight 102 kg (223 lb 12.8 oz).    Relevant Results      No results found for this or any previous visit (from the past 24 hours).  Imaging  CT soft tissue neck w IV contrast  Result Date: 4/30/2025  1. Significant interval increase in size of cystic and solid lesion in the right parotid tail with majority of growth related to the cystic component. The cystic component was hyperdense on the noncontrast CT portion of the prior PET CT study in which there was no increased metabolic activity. This is likely benign and may represent a Warthin's tumor in this patient with a significant smoking history or less likely a sialocele. Consider contrast-enhanced MRI for better characterization or tissue sampling consider of the solid components. 2. Additional heterogeneous enhancing lesion inferior to the above lesion of unclear etiology. Given lack of FDG avidity, doubt that this reflects a malignant lesion. Although rare, these 2 lesions may represent multifocal Warthin's tumors. Again, contrast-enhanced MRI or tissue sampling. 3. No pathologic lymphadenopathy by imaging criteria. 4. Moderate mucosal thickening of the right maxillary sinus with frothy secretions which can be seen in a more acute setting; clinically correlate.     I personally reviewed the images/study and I agree with the findings as stated.   MACRO: None   Signed by: Cecily  Edd 4/30/2025 12:49 PM Dictation workstation:   [id number]      Cardiology, Vascular, and Other Imaging  No other imaging results found for the past 7 days      Assessment and Plan  64 y.o. male with right sided parotid mass with increased cystic component over the last 4 years.  FNA was previously obtained and essentially showed cystic contents.  We discussed that most of these lesions are benign however since it has recurred and slightly increased in size would recommend parotidectomy for definitive management diagnosis.  We discussed procedure in detail including the risks of bleeding, infection, numbness, cranial neuropathies.  We discussed drain placement and expected postoperative course.  Patient is interested in proceeding with surgery and we will work on scheduling at his convenience    Barrera Gomez MD         [1]   Family History  Problem Relation Name Age of Onset    Pancreatic cancer Father

## 2025-05-20 DIAGNOSIS — K11.8 PAROTID MASS: ICD-10-CM

## 2025-06-12 ENCOUNTER — LAB (OUTPATIENT)
Dept: LAB | Facility: HOSPITAL | Age: 65
End: 2025-06-12
Payer: COMMERCIAL

## 2025-06-12 ENCOUNTER — PRE-ADMISSION TESTING (OUTPATIENT)
Dept: PREADMISSION TESTING | Facility: HOSPITAL | Age: 65
End: 2025-06-12
Payer: COMMERCIAL

## 2025-06-12 VITALS
RESPIRATION RATE: 16 BRPM | BODY MASS INDEX: 30.76 KG/M2 | WEIGHT: 227.07 LBS | SYSTOLIC BLOOD PRESSURE: 152 MMHG | OXYGEN SATURATION: 94 % | HEIGHT: 72 IN | DIASTOLIC BLOOD PRESSURE: 74 MMHG | TEMPERATURE: 97.3 F | HEART RATE: 90 BPM

## 2025-06-12 DIAGNOSIS — Z01.818 PRE-OP TESTING: Primary | ICD-10-CM

## 2025-06-12 DIAGNOSIS — Z01.818 ENCOUNTER FOR OTHER PREPROCEDURAL EXAMINATION: Primary | ICD-10-CM

## 2025-06-12 LAB
ANION GAP SERPL CALC-SCNC: 11 MMOL/L (ref 10–20)
ATRIAL RATE: 90 BPM
BASOPHILS # BLD AUTO: 0.04 X10*3/UL (ref 0–0.1)
BASOPHILS NFR BLD AUTO: 0.5 %
BUN SERPL-MCNC: 13 MG/DL (ref 6–23)
CALCIUM SERPL-MCNC: 9.9 MG/DL (ref 8.6–10.3)
CHLORIDE SERPL-SCNC: 98 MMOL/L (ref 98–107)
CO2 SERPL-SCNC: 33 MMOL/L (ref 21–32)
CREAT SERPL-MCNC: 0.71 MG/DL (ref 0.5–1.3)
EGFRCR SERPLBLD CKD-EPI 2021: >90 ML/MIN/1.73M*2
EOSINOPHIL # BLD AUTO: 0.15 X10*3/UL (ref 0–0.7)
EOSINOPHIL NFR BLD AUTO: 1.9 %
ERYTHROCYTE [DISTWIDTH] IN BLOOD BY AUTOMATED COUNT: 15 % (ref 11.5–14.5)
GLUCOSE SERPL-MCNC: 147 MG/DL (ref 74–99)
HCT VFR BLD AUTO: 49.3 % (ref 41–52)
HGB BLD-MCNC: 15.9 G/DL (ref 13.5–17.5)
IMM GRANULOCYTES # BLD AUTO: 0.14 X10*3/UL (ref 0–0.7)
IMM GRANULOCYTES NFR BLD AUTO: 1.7 % (ref 0–0.9)
LYMPHOCYTES # BLD AUTO: 1.46 X10*3/UL (ref 1.2–4.8)
LYMPHOCYTES NFR BLD AUTO: 18 %
MCH RBC QN AUTO: 28.5 PG (ref 26–34)
MCHC RBC AUTO-ENTMCNC: 32.3 G/DL (ref 32–36)
MCV RBC AUTO: 88 FL (ref 80–100)
MONOCYTES # BLD AUTO: 0.6 X10*3/UL (ref 0.1–1)
MONOCYTES NFR BLD AUTO: 7.4 %
NEUTROPHILS # BLD AUTO: 5.71 X10*3/UL (ref 1.2–7.7)
NEUTROPHILS NFR BLD AUTO: 70.5 %
NRBC BLD-RTO: 0 /100 WBCS (ref 0–0)
P AXIS: 62 DEGREES
P OFFSET: 209 MS
P ONSET: 159 MS
PLATELET # BLD AUTO: 195 X10*3/UL (ref 150–450)
POTASSIUM SERPL-SCNC: 4.3 MMOL/L (ref 3.5–5.3)
PR INTERVAL: 120 MS
Q ONSET: 219 MS
QRS COUNT: 14 BEATS
QRS DURATION: 138 MS
QT INTERVAL: 368 MS
QTC CALCULATION(BAZETT): 450 MS
QTC FREDERICIA: 421 MS
R AXIS: 78 DEGREES
RBC # BLD AUTO: 5.58 X10*6/UL (ref 4.5–5.9)
SODIUM SERPL-SCNC: 138 MMOL/L (ref 136–145)
T AXIS: 30 DEGREES
T OFFSET: 403 MS
VENTRICULAR RATE: 90 BPM
WBC # BLD AUTO: 8.1 X10*3/UL (ref 4.4–11.3)

## 2025-06-12 PROCEDURE — 36415 COLL VENOUS BLD VENIPUNCTURE: CPT

## 2025-06-12 PROCEDURE — 99203 OFFICE O/P NEW LOW 30 MIN: CPT | Performed by: NURSE PRACTITIONER

## 2025-06-12 PROCEDURE — 93005 ELECTROCARDIOGRAM TRACING: CPT

## 2025-06-12 PROCEDURE — 85025 COMPLETE CBC W/AUTO DIFF WBC: CPT

## 2025-06-12 PROCEDURE — 80048 BASIC METABOLIC PNL TOTAL CA: CPT

## 2025-06-12 ASSESSMENT — ENCOUNTER SYMPTOMS
COUGH: 1
EYES NEGATIVE: 1
NECK NEGATIVE: 1
GASTROINTESTINAL NEGATIVE: 1
MUSCULOSKELETAL NEGATIVE: 1
CONSTITUTIONAL NEGATIVE: 1

## 2025-06-12 ASSESSMENT — DUKE ACTIVITY SCORE INDEX (DASI)
CAN YOU CLIMB A FLIGHT OF STAIRS OR WALK UP A HILL: YES
CAN YOU DO LIGHT WORK AROUND THE HOUSE LIKE DUSTING OR WASHING DISHES: YES
TOTAL_SCORE: 39.45
CAN YOU DO HEAVY WORK AROUND THE HOUSE LIKE SCRUBBING FLOORS OR LIFTING AND MOVING HEAVY FURNITURE: YES
CAN YOU HAVE SEXUAL RELATIONS: NO
CAN YOU RUN A SHORT DISTANCE: YES
CAN YOU DO YARD WORK LIKE RAKING LEAVES, WEEDING OR PUSHING A MOWER: YES
CAN YOU PARTICIPATE IN MODERATE RECREATIONAL ACTIVITIES LIKE GOLF, BOWLING, DANCING, DOUBLES TENNIS OR THROWING A BASEBALL OR FOOTBALL: NO
DASI METS SCORE: 7.6
CAN YOU WALK A BLOCK OR TWO ON LEVEL GROUND: YES
CAN YOU WALK INDOORS, SUCH AS AROUND YOUR HOUSE: YES
CAN YOU TAKE CARE OF YOURSELF (EAT, DRESS, BATHE, OR USE TOILET): YES
CAN YOU PARTICIPATE IN STRENOUS SPORTS LIKE SWIMMING, SINGLES TENNIS, FOOTBALL, BASKETBALL, OR SKIING: NO
CAN YOU DO MODERATE WORK AROUND THE HOUSE LIKE VACUUMING, SWEEPING FLOORS OR CARRYING GROCERIES: YES

## 2025-06-12 ASSESSMENT — LIFESTYLE VARIABLES: SMOKING_STATUS: SMOKER

## 2025-06-12 ASSESSMENT — ACTIVITIES OF DAILY LIVING (ADL): ADL_SCORE: 0

## 2025-06-12 ASSESSMENT — PAIN - FUNCTIONAL ASSESSMENT: PAIN_FUNCTIONAL_ASSESSMENT: 0-10

## 2025-06-12 NOTE — H&P (VIEW-ONLY)
"CPM/PAT Evaluation       Name: Serafin Salgado)  /Age: 1960/65 y.o.     In-Person       Chief Complaint: parotid mass    HPI This is a pleasant 64 yo with Pmhx of CAD, DM, HLD, HTN, prostate Ca, tobacco abuse, and right sided parotid mass.  Pt states he has had mass for a few years.  He has had FNA in  which showed cystic contents.  Pt reports increase in size and discomfort.  Plan is for right parotidectomy with Dr. Gomez on .      Medical History[1]    Surgical History[2]    Patient  reports that he is not currently sexually active.    Family History[3]    Allergies[4]    Prior to Admission medications    Medication Sig Start Date End Date Taking? Authorizing Provider   atorvastatin (Lipitor) 40 mg tablet Take by mouth. 20   Historical Provider, MD   BD Ultra-Fine Mini Pen Needle 31 gauge x 3/16\" needle  23   Historical Provider, MD   cholecalciferol (Vitamin D-3) 50,000 unit capsule Take 1 capsule (1.25 mg) by mouth every 30 (thirty) days.    Historical Provider, MD   cyclobenzaprine (Flexeril) 10 mg tablet Take 1 tablet (10 mg) by mouth as needed at bedtime.  Patient not taking: Reported on 2025   Historical Provider, MD   diazePAM (Valium) 5 mg tablet Take 1 tablet (5 mg) by mouth 1 time for 1 dose. Take prior to PET scan 4/15/25 4/15/25  Jluis Cooper MD   glimepiride (Amaryl) 4 mg tablet Take 1 tablet (4 mg) by mouth once daily.    Historical Provider, MD   HumaLOG KwikPen Insulin 100 unit/mL injection  23   Historical Provider, MD   metFORMIN (Glucophage) 500 mg tablet Take 1 tablet (500 mg) by mouth twice a day.    Historical Provider, MD   nitroglycerin (Nitrostat) 0.4 mg SL tablet DISSOLVE 1 TABLET UNDER TONGUE EVERY 5 MINUTES FOR 3 DOSES AS NEEDED THEN ONLY THEN CONTACT EMS    Historical Provider, MD   omega-3 1,000 mg capsule capsule Take 1 capsule (1,000 mg) by mouth twice a day.    Historical Provider, MD   Plavix 75 mg tablet Take by " mouth.  Patient taking differently: Take by mouth once daily.    Historical Provider, MD   Semglee,insulin glarg-yfgn,Pen 100 unit/mL (3 mL) Pen  1/3/24   Historical Provider, MD BEDOYA ROS:   Constitutional:   neg    Neuro/Psych:    Occasional numbness left bottom of foot  Eyes:   neg     use of corrective lenses  Ears:   Nose:   neg    Mouth:    See HPI  Throat:   neg    Neck:    No carotid bruits ausculated  neg    Cardio:    Hx of CAD s/p PTCA and stenting with LHC in 2019 -EF with ventriculogram  EF was 60 %  Respiratory:    Smoker  Cutting back   cough  Endocrine:    IDDM  last A1C 7.0  GI:   neg    :    PSMA PET 4/18/25   1. Status post prostatectomy, no PET evidence of local recurrence in the postsurgical bed.  2. No PET evidence of sanjeev or distant metastasis.     1/11/23Prostate biopsy - Mane GG4 prostate cancer   3/16/23Radical prostatectomy - Mane GG4 cancer with EPE, positive R anterior or L anterior margins    Musculoskeletal:   neg    Hematologic:   neg    Skin:  neg        Physical Exam  Constitutional:       Appearance: Normal appearance.   HENT:      Head: Normocephalic and atraumatic.      Nose: Nose normal.      Mouth/Throat:      Mouth: Mucous membranes are moist.   Eyes:      Pupils: Pupils are equal, round, and reactive to light.   Neck:      Comments: No carotid bruits ausculated  Cardiovascular:      Rate and Rhythm: Normal rate and regular rhythm.   Pulmonary:      Effort: Pulmonary effort is normal.      Comments: Decrease air exchange  No rales no wheeze  Abdominal:      General: Bowel sounds are normal.      Palpations: Abdomen is soft.   Musculoskeletal:      Cervical back: Normal range of motion.      Comments: No LE ankle edema   Skin:     General: Skin is warm and dry.   Neurological:      General: No focal deficit present.      Mental Status: He is alert and oriented to person, place, and time.   Psychiatric:         Mood and Affect: Mood normal.         Behavior:  Behavior normal.      Airway: nl neck ROM  Anesthesia:  Patient denies any anesthesia complications.   Teeth: intact    Testing/Diagnostic:   ECG: NSR RBBB rate of 90    Recent Results (from the past week)   ECG 12 lead    Collection Time: 06/12/25 10:15 AM   Result Value Ref Range    Ventricular Rate 90 BPM    Atrial Rate 90 BPM    ME Interval 120 ms    QRS Duration 138 ms    QT Interval 368 ms    QTC Calculation(Bazett) 450 ms    P Axis 62 degrees    R Axis 78 degrees    T Axis 30 degrees    QRS Count 14 beats    Q Onset 219 ms    P Onset 159 ms    P Offset 209 ms    T Offset 403 ms    QTC Fredericia 421 ms   CBC and Auto Differential    Collection Time: 06/12/25 11:31 AM   Result Value Ref Range    WBC 8.1 4.4 - 11.3 x10*3/uL    nRBC 0.0 0.0 - 0.0 /100 WBCs    RBC 5.58 4.50 - 5.90 x10*6/uL    Hemoglobin 15.9 13.5 - 17.5 g/dL    Hematocrit 49.3 41.0 - 52.0 %    MCV 88 80 - 100 fL    MCH 28.5 26.0 - 34.0 pg    MCHC 32.3 32.0 - 36.0 g/dL    RDW 15.0 (H) 11.5 - 14.5 %    Platelets 195 150 - 450 x10*3/uL    Neutrophils % 70.5 40.0 - 80.0 %    Immature Granulocytes %, Automated 1.7 (H) 0.0 - 0.9 %    Lymphocytes % 18.0 13.0 - 44.0 %    Monocytes % 7.4 2.0 - 10.0 %    Eosinophils % 1.9 0.0 - 6.0 %    Basophils % 0.5 0.0 - 2.0 %    Neutrophils Absolute 5.71 1.20 - 7.70 x10*3/uL    Immature Granulocytes Absolute, Automated 0.14 0.00 - 0.70 x10*3/uL    Lymphocytes Absolute 1.46 1.20 - 4.80 x10*3/uL    Monocytes Absolute 0.60 0.10 - 1.00 x10*3/uL    Eosinophils Absolute 0.15 0.00 - 0.70 x10*3/uL    Basophils Absolute 0.04 0.00 - 0.10 x10*3/uL   Basic metabolic panel    Collection Time: 06/12/25 11:31 AM   Result Value Ref Range    Glucose 147 (H) 74 - 99 mg/dL    Sodium 138 136 - 145 mmol/L    Potassium 4.3 3.5 - 5.3 mmol/L    Chloride 98 98 - 107 mmol/L    Bicarbonate 33 (H) 21 - 32 mmol/L    Anion Gap 11 10 - 20 mmol/L    Urea Nitrogen 13 6 - 23 mg/dL    Creatinine 0.71 0.50 - 1.30 mg/dL    eGFR >90 >60 mL/min/1.73m*2     Calcium 9.9 8.6 - 10.3 mg/dL             Imaging  CT soft tissue neck w IV contrast  Result Date: 4/30/2025  1. Significant interval increase in size of cystic and solid lesion in the right parotid tail with majority of growth related to the cystic component. The cystic component was hyperdense on the noncontrast CT portion of the prior PET CT study in which there was no increased metabolic activity. This is likely benign and may represent a Warthin's tumor in this patient with a significant smoking history or less likely a sialocele. Consider contrast-enhanced MRI for better characterization or tissue sampling consider of the solid components. 2. Additional heterogeneous enhancing lesion inferior to the above lesion of unclear etiology. Given lack of FDG avidity, doubt that this reflects a malignant lesion. Although rare, these 2 lesions may represent multifocal Warthin's tumors. Again, contrast-enhanced MRI or tissue sampling. 3. No pathologic lymphadenopathy by imaging criteria. 4. Moderate mucosal thickening of the right maxillary sinus with frothy secretions which can be seen in a more acute setting; clinically correlate.     I personally reviewed the images/study and I agree with the findings as stated.   MACRO: None   Signed by: Cecily Puga 4/30/2025 12:49 PM Dictation workstation:   [id number]     [facility] 7/1/ 2019: Dr Cornell  CONCLUSIONS:   1. Successful angioplasty and stenting to critical stenosis in mid LAD, drug-eluting stent.   2. Successful angioplasty and stenting to severe stenosis in distal circumflex, drug-eluting stent.   3. Successful angioplasty and stenting to critical stenosis in mid circumflex, drug-eluting stent.   4. Normal LV function, EF 60%.   5. Aggressive secondary prevention is indicated.   CPT Codes:  [id number] Enio Cornell MD  Performing Physician  Electronically signed by [id number] Enio Cornell MD on 7/1/2019 at 10:19:33 AM  Patient Specialist/PCP: cardiology Dr. Cornell    Visit Vitals  /74    Pulse 90   Temp 36.3 °C (97.3 °F) (Temporal)   Resp 16   Ht 1.829 m (6')   Wt 103 kg (227 lb 1.2 oz)   SpO2 94%   BMI 30.80 kg/m²   Smoking Status Every Day   BSA 2.29 m²       DASI Risk Score      Flowsheet Row Pre-Admission Testing from 6/12/2025 in [facility]  Most recent reading at 6/12/2025 10:36 AM Questionnaire Series Submission from 6/12/2025 in [facility] OR with Generic Provider Samanta  Most recent reading at 6/12/2025  8:20 AM   Can you take care of yourself (eat, dress, bathe, or use toilet)?  2.75 filed at 06/12/2025 1036 2.75  filed at 06/12/2025 0820   Can you walk indoors, such as around your house? 1.75 filed at 06/12/2025 1036 1.75  filed at 06/12/2025 0820   Can you walk a block or two on level ground?  2.75 filed at 06/12/2025 1036 2.75  filed at 06/12/2025 0820   Can you climb a flight of stairs or walk up a hill? 5.5 filed at 06/12/2025 1036 5.5  filed at 06/12/2025 0820   Can you run a short distance? 8 filed at 06/12/2025 1036 8  filed at 06/12/2025 0820   Can you do light work around the house like dusting or washing dishes? 2.7 filed at 06/12/2025 1036 2.7  filed at 06/12/2025 0820   Can you do moderate work around the house like vacuuming, sweeping floors or carrying groceries? 3.5 filed at 06/12/2025 1036 3.5  filed at 06/12/2025 0820   Can you do heavy work around the house like scrubbing floors or lifting and moving heavy furniture?  8 filed at 06/12/2025 1036 8  filed at 06/12/2025 0820   Can you do yard work like raking leaves, weeding or pushing a mower? 4.5 filed at 06/12/2025 1036 4.5  filed at 06/12/2025 0820   Can you have sexual relations? 0 filed at 06/12/2025 1036 0  filed at 06/12/2025 0820   Can you participate in moderate recreational activities like golf, bowling, dancing, doubles tennis or throwing a baseball or football? 0 filed at 06/12/2025 1036 6  filed at 06/12/2025 0820   Can you participate in strenous sports like swimming,  singles tennis, football, basketball, or skiing? 0 filed at 06/12/2025 1036 0  filed at 06/12/2025 0820   DASI SCORE 39.45 filed at 06/12/2025 1036 45.45  filed at 06/12/2025 0820   METS Score (Will be calculated only when all the questions are answered) 7.6 filed at 06/12/2025 1036 8.3  filed at 06/12/2025 0820          Caprini DVT Assessment      Flowsheet Row Pre-Admission Testing from 6/12/2025 in [facility]   DVT Score (IF A SCORE IS NOT CALCULATING, MUST SELECT A BMI TO COMPLETE) 10 filed at 06/12/2025 1034   Medical Factors Present cancer, chemotherapy, or previous malignancy filed at 06/12/2025 1034   Surgical Factors Major surgery planned, lasting over 3 hours filed at 06/12/2025 1034   BMI (BMI MUST BE CHOSEN) 30 or less filed at 06/12/2025 1034          Modified Frailty Index      Flowsheet Row Pre-Admission Testing from 6/12/2025 in [facility]   Non-independent functional status (problems with dressing, bathing, personal grooming, or cooking) 0 filed at 06/12/2025 1036   History of diabetes mellitus  0.0909 filed at 06/12/2025 1036   History of COPD 0 filed at 06/12/2025 1036   History of CHF No filed at 06/12/2025 1036   History of MI 0 filed at 06/12/2025 1036   History of Percutaneous Coronary Intervention, Cardiac Surgery, or Angina 0.0909 filed at 06/12/2025 1036   Hypertension requiring the use of medication  0.0909 filed at 06/12/2025 1036   Peripheral vascular disease 0 filed at 06/12/2025 1036   Impaired sensorium (cognitive impairement or loss, clouding, or delirium) 0 filed at 06/12/2025 1036   TIA or CVA withouy residual deficit 0 filed at 06/12/2025 1036   Cerebrovascular accident with deficit 0 filed at 06/12/2025 1036   Modified Frailty Index Calculator .2727 filed at 06/12/2025 1036          [id number] Stroke Risk Points  Current as of just now        N/A 0 to 9 Points:      Last Change: N/A          The [id number] risk score (Lip GH, et al. 2009. ©  2010 [employer]) quantifies the risk of stroke for a patient with atrial fibrillation. For patients without atrial fibrillation or under the age of 18 this score appears as N/A. Higher score values generally indicate higher risk of stroke.        This score is not applicable to this patient. Components are not calculated.          Revised Cardiac Risk Index      Flowsheet Row Pre-Admission Testing from 6/12/2025 in [facility]   High-Risk Surgery (Intraperitoneal, Intrathoracic,Suprainguinal vascular) 0 filed at 06/12/2025 1036   History of ischemic heart disease (History of MI, History of positive exercuse test, Current chest paint considered due to myocardial ischemia, Use of nitrate therapy, ECG with pathological Q Waves) 0 filed at 06/12/2025 1036   History of congestive heart failure (pulmonary edemia, bilateral rales or S3 gallop, Paroxysmal nocturnal dyspnea, CXR showing pulmonary vascular redistribution) 0 filed at 06/12/2025 1036   History of cerebrovascular disease (Prior TIA or stroke) 0 filed at 06/12/2025 1036   Pre-operative insulin treatment 1 filed at 06/12/2025 1036   Pre-operative creatinine>2 mg/dl 0 filed at 06/12/2025 1036   Revised Cardiac Risk Calculator 1 filed at 06/12/2025 1036          Apfel Simplified Score      Flowsheet Row Pre-Admission Testing from 6/12/2025 in [facility]   Smoking status 0 filed at 06/12/2025 1036   History of motion sickness or PONV  0 filed at 06/12/2025 1036   Use of postoperative opioids 1 filed at 06/12/2025 1036   Gender - Female 0=No filed at 06/12/2025 1036   Apfel Simplified Score Calculator 1 filed at 06/12/2025 1036          Risk Analysis Index Results This Encounter         6/12/2025  1037             Do you live in a place other than your own home?: 0    When did you begin living in the place you are currently residing?: Greater than one year ago    Any kidney failure, kidney not working well, or  seeing a kidney doctor (nephrologist)? If yes, was this for kidney stones or another problem?: 0 No    Any history of chronic (long-term) congestive heart failure (CHF)?: 0 No    Any shortness of breath when resting?: 0 No    In the past five years, have you been diagnosed with or treated for cancer?: Yes    During the last 3 months has it become difficult for you to remember things or organize your thoughts?: 0 No    Have you lost weight of 10 pounds or more in the past 3 months without trying?: 0 No    Do you have any loss of appetitie?: 0 No    Getting Around (Mobility): 0 Can get around without help    Eatin Can plan and prepare own meals    Toiletin Can use toilet without any help    Personal Hygiene (Bathing, Hand Washing, Changing Clothes): 0 Can shower or bathe without any help    KHALIL Cancer History: Patient indicates history of cancer    Total Risk Analysis Index Score Without Cancer: 21    Total Risk Analysis Index Score: 36          Stop Bang Score      Flowsheet Row Pre-Admission Testing from 2025 in [facility]  Most recent reading at 2025 10:35 AM Questionnaire Series Submission from 2025 in [facility]  Most recent reading at 2025  8:20 AM   Do you snore loudly? 0 filed at 2025 1035 0  filed at 2025 0820   Do you often feel tired or fatigued after your sleep? 0 filed at 2025 1035 0  filed at 2025 0820   Has anyone ever observed you stop breathing in your sleep? 0 filed at 2025 1035 0  filed at 2025 0820   Do you have or are you being treated for high blood pressure? 1 filed at 2025 1035 0  filed at 2025 0820   Recent BMI (Calculated) 30.8 filed at 2025 30.4  filed at 2025 0820   Is BMI greater than 35 kg/m2? 0=No filed at 2025 0=No  filed at 2025 0820   Age older than 50 years old? 1=Yes filed at 2025 1035 1=Yes  filed at  06/12/2025 0820   Is your neck circumference greater than 17 inches (Male) or 16 inches (Female)? 0 filed at 06/12/2025 1035 --   Gender - Male 1=Yes filed at 06/12/2025 1035 1=Yes  filed at 06/12/2025 0820   STOP-BANG Total Score 3 filed at 06/12/2025 1035 --          Prodigy: High Risk  Total Score: 16              Prodigy Age Score      Prodigy Gender Score          ARISCAT Score for Postoperative Pulmonary Complications      Flowsheet Row Pre-Admission Testing from 6/12/2025 in [facility]   Age Calculated Score 3 filed at 06/12/2025 1037   Preoperative SpO2 8 filed at 06/12/2025 1037   Respiratory infection in the last month Either upper or lower (i.e., URI, bronchitis, pneumonia), with fever and antibiotic treatment 0 filed at 06/12/2025 1037   Preoperative anemia (Hgb less than 10 g/dl) 0 filed at 06/12/2025 1037   Surgical incision  0 filed at 06/12/2025 1037   Duration of surgery  23 filed at 06/12/2025 1037   Emergency Procedure  0 filed at 06/12/2025 1037   ARISCAT Total Score  34 filed at 06/12/2025 1037          Aguiar Perioperative Risk for Myocardial Infarction or Cardiac Arrest (LOLA)      Flowsheet Row Pre-Admission Testing from 6/12/2025 in [facility]   Calculated Age Score 1.28 filed at 06/12/2025 1037   Functional Status  0 filed at 06/12/2025 1037   ASA Class  -3.29 filed at 06/12/2025 1037   Creatinine 0 filed at 06/12/2025 1037   Type of Procedure  0.71 filed at 06/12/2025 1037   LOLA Total Score  -6.55 filed at 06/12/2025 1037   LOLA % 0.14 filed at 06/12/2025 1037        Assessment and Plan:     Plan for OR on 6/20 for        EXCISION, PAROTID GLAND [05557 (CPT®)] Right General   Right parotidectomy with nerve monitor     Due to parotid mass  CBC, BMP, ECG    HEENT/Airway:    See surgical plan    Cardiovascular:   CAD s/p PTCA and stenting  Follows with Dr. Cornell  Requesting most recent ECHO stress test and office note  Pt states he completed testing recently  and has cardiac clearance  DASI  [facility] Activity Status Index (DASI)  DASI Score: 39.45   MET Score: 7.6  RCRI: 1 point, 6.0% risk for postoperative MACE   LOLA: 0.14% risk for postoperative MACE      Pulmonary:    The patient is current tobacco user.  Advised to quit smoking to improve overall health and to decrease risks for anesthesia related complications as well as postoperative wound healing complications.  Smoking cessation educational handout provided to patient during appointment.    Preoperative deep breathing educational handout provided to patient.  ARISCAT: 34     points which is a intermediate (13.3%) risk of in-hospital post-op pulmonary complications   STOP BANG:  3   points which is a intermediate risk for moderate to severe ISABEL    Renal:   Hx of prostate CA follows with heme/onc and radiation oncology    Endocrine:    IDDM  In insulin pt reports A1C has come down from original of 11  Last A1C 7.0    Hematologic:   no significant findings on chart review or clinical presentation  Preoperative DVT educational handout provided to patient.  Caprini Score: 10   points which is a highest risk of perioperative VTE    Gastrointestinal:   no significant findings on chart review or clinical presentation  Apfel: 1 points 21% risk for post operative N/V    Infectious disease:  no significant findings on chart review or clinical presentation     Musculoskeletal:    No acute active issues    Neuro:      Pt denies hx of seizure or stroke  Neuropathy left foot    Preoperative brain exercise educational handout provided to patient.  The patient is at an increased risk for perioperative stroke secondary to cardiac disease, HTN, HLD, DM , and general anesthesia.     I spent a total of 30   minutes on the date of the service which included preparing to see the patient, face-to-face patient care,  performing a physical examination, completing clinical documentation, discussing assessment and plan with the  patient/family/caregiver, educating the patient/family/caregiver on pre-operative medication instructions and ordering tests if applicable.               [1]   Past Medical History:  Diagnosis Date    CAD (coronary artery disease)     DM2 (diabetes mellitus, type 2) (Multi)     HLD (hyperlipidemia)     HTN (hypertension)     Osteoarthritis     Parotid mass     Prostate cancer (Multi)    [2]   Past Surgical History:  Procedure Laterality Date    CORONARY STENT PLACEMENT      MENISCECTOMY      PROSTATECTOMY     [3]   Family History  Problem Relation Name Age of Onset    Diabetes Mother      Pancreatic cancer Father      Diabetes Father     [4]   Allergies  Allergen Reactions    Gabapentin Headache and Dizziness

## 2025-06-12 NOTE — CPM/PAT H&P
"CPM/PAT Evaluation       Name: Serafin Salgado)  /Age: 1960/65 y.o.     In-Person       Chief Complaint: parotid mass    HPI This is a pleasant 64 yo with Pmhx of CAD, DM, HLD, HTN, prostate Ca, tobacco abuse, and right sided parotid mass.  Pt states he has had mass for a few years.  He has had FNA in  which showed cystic contents.  Pt reports increase in size and discomfort.  Plan is for right parotidectomy with Dr. Gomez on .      Medical History[1]    Surgical History[2]    Patient  reports that he is not currently sexually active.    Family History[3]    Allergies[4]    Prior to Admission medications    Medication Sig Start Date End Date Taking? Authorizing Provider   atorvastatin (Lipitor) 40 mg tablet Take by mouth. 20   Historical Provider, MD   BD Ultra-Fine Mini Pen Needle 31 gauge x 3/16\" needle  23   Historical Provider, MD   cholecalciferol (Vitamin D-3) 50,000 unit capsule Take 1 capsule (1.25 mg) by mouth every 30 (thirty) days.    Historical Provider, MD   cyclobenzaprine (Flexeril) 10 mg tablet Take 1 tablet (10 mg) by mouth as needed at bedtime.  Patient not taking: Reported on 2025   Historical Provider, MD   diazePAM (Valium) 5 mg tablet Take 1 tablet (5 mg) by mouth 1 time for 1 dose. Take prior to PET scan 4/15/25 4/15/25  Jluis Cooper MD   glimepiride (Amaryl) 4 mg tablet Take 1 tablet (4 mg) by mouth once daily.    Historical Provider, MD   HumaLOG KwikPen Insulin 100 unit/mL injection  23   Historical Provider, MD   metFORMIN (Glucophage) 500 mg tablet Take 1 tablet (500 mg) by mouth twice a day.    Historical Provider, MD   nitroglycerin (Nitrostat) 0.4 mg SL tablet DISSOLVE 1 TABLET UNDER TONGUE EVERY 5 MINUTES FOR 3 DOSES AS NEEDED THEN ONLY THEN CONTACT EMS    Historical Provider, MD   omega-3 1,000 mg capsule capsule Take 1 capsule (1,000 mg) by mouth twice a day.    Historical Provider, MD   Plavix 75 mg tablet Take by " mouth.  Patient taking differently: Take by mouth once daily.    Historical Provider, MD   Semglee,insulin glarg-yfgn,Pen 100 unit/mL (3 mL) Pen  1/3/24   Historical Provider, MD BEDOYA ROS:   Constitutional:   neg    Neuro/Psych:    Occasional numbness left bottom of foot  Eyes:   neg     use of corrective lenses  Ears:   Nose:   neg    Mouth:    See HPI  Throat:   neg    Neck:    No carotid bruits ausculated  neg    Cardio:    Hx of CAD s/p PTCA and stenting with LHC in 2019 -EF with ventriculogram  EF was 60 %  Respiratory:    Smoker  Cutting back   cough  Endocrine:    IDDM  last A1C 7.0  GI:   neg    :    PSMA PET 4/18/25   1. Status post prostatectomy, no PET evidence of local recurrence in the postsurgical bed.  2. No PET evidence of sanjeev or distant metastasis.     1/11/23Prostate biopsy - Mane GG4 prostate cancer   3/16/23Radical prostatectomy - Mane GG4 cancer with EPE, positive R anterior or L anterior margins    Musculoskeletal:   neg    Hematologic:   neg    Skin:  neg        Physical Exam  Constitutional:       Appearance: Normal appearance.   HENT:      Head: Normocephalic and atraumatic.      Nose: Nose normal.      Mouth/Throat:      Mouth: Mucous membranes are moist.   Eyes:      Pupils: Pupils are equal, round, and reactive to light.   Neck:      Comments: No carotid bruits ausculated  Cardiovascular:      Rate and Rhythm: Normal rate and regular rhythm.   Pulmonary:      Effort: Pulmonary effort is normal.      Comments: Decrease air exchange  No rales no wheeze  Abdominal:      General: Bowel sounds are normal.      Palpations: Abdomen is soft.   Musculoskeletal:      Cervical back: Normal range of motion.      Comments: No LE ankle edema   Skin:     General: Skin is warm and dry.   Neurological:      General: No focal deficit present.      Mental Status: He is alert and oriented to person, place, and time.   Psychiatric:         Mood and Affect: Mood normal.         Behavior:  Behavior normal.      Airway: nl neck ROM  Anesthesia:  Patient denies any anesthesia complications.   Teeth: intact    Testing/Diagnostic:   ECG: NSR RBBB rate of 90    Recent Results (from the past week)   ECG 12 lead    Collection Time: 06/12/25 10:15 AM   Result Value Ref Range    Ventricular Rate 90 BPM    Atrial Rate 90 BPM    FL Interval 120 ms    QRS Duration 138 ms    QT Interval 368 ms    QTC Calculation(Bazett) 450 ms    P Axis 62 degrees    R Axis 78 degrees    T Axis 30 degrees    QRS Count 14 beats    Q Onset 219 ms    P Onset 159 ms    P Offset 209 ms    T Offset 403 ms    QTC Fredericia 421 ms   CBC and Auto Differential    Collection Time: 06/12/25 11:31 AM   Result Value Ref Range    WBC 8.1 4.4 - 11.3 x10*3/uL    nRBC 0.0 0.0 - 0.0 /100 WBCs    RBC 5.58 4.50 - 5.90 x10*6/uL    Hemoglobin 15.9 13.5 - 17.5 g/dL    Hematocrit 49.3 41.0 - 52.0 %    MCV 88 80 - 100 fL    MCH 28.5 26.0 - 34.0 pg    MCHC 32.3 32.0 - 36.0 g/dL    RDW 15.0 (H) 11.5 - 14.5 %    Platelets 195 150 - 450 x10*3/uL    Neutrophils % 70.5 40.0 - 80.0 %    Immature Granulocytes %, Automated 1.7 (H) 0.0 - 0.9 %    Lymphocytes % 18.0 13.0 - 44.0 %    Monocytes % 7.4 2.0 - 10.0 %    Eosinophils % 1.9 0.0 - 6.0 %    Basophils % 0.5 0.0 - 2.0 %    Neutrophils Absolute 5.71 1.20 - 7.70 x10*3/uL    Immature Granulocytes Absolute, Automated 0.14 0.00 - 0.70 x10*3/uL    Lymphocytes Absolute 1.46 1.20 - 4.80 x10*3/uL    Monocytes Absolute 0.60 0.10 - 1.00 x10*3/uL    Eosinophils Absolute 0.15 0.00 - 0.70 x10*3/uL    Basophils Absolute 0.04 0.00 - 0.10 x10*3/uL   Basic metabolic panel    Collection Time: 06/12/25 11:31 AM   Result Value Ref Range    Glucose 147 (H) 74 - 99 mg/dL    Sodium 138 136 - 145 mmol/L    Potassium 4.3 3.5 - 5.3 mmol/L    Chloride 98 98 - 107 mmol/L    Bicarbonate 33 (H) 21 - 32 mmol/L    Anion Gap 11 10 - 20 mmol/L    Urea Nitrogen 13 6 - 23 mg/dL    Creatinine 0.71 0.50 - 1.30 mg/dL    eGFR >90 >60 mL/min/1.73m*2     Calcium 9.9 8.6 - 10.3 mg/dL             Imaging  CT soft tissue neck w IV contrast  Result Date: 4/30/2025  1. Significant interval increase in size of cystic and solid lesion in the right parotid tail with majority of growth related to the cystic component. The cystic component was hyperdense on the noncontrast CT portion of the prior PET CT study in which there was no increased metabolic activity. This is likely benign and may represent a Warthin's tumor in this patient with a significant smoking history or less likely a sialocele. Consider contrast-enhanced MRI for better characterization or tissue sampling consider of the solid components. 2. Additional heterogeneous enhancing lesion inferior to the above lesion of unclear etiology. Given lack of FDG avidity, doubt that this reflects a malignant lesion. Although rare, these 2 lesions may represent multifocal Warthin's tumors. Again, contrast-enhanced MRI or tissue sampling. 3. No pathologic lymphadenopathy by imaging criteria. 4. Moderate mucosal thickening of the right maxillary sinus with frothy secretions which can be seen in a more acute setting; clinically correlate.     I personally reviewed the images/study and I agree with the findings as stated.   MACRO: None   Signed by: Cecily Puga 4/30/2025 12:49 PM Dictation workstation:   [id number]     [facility] 7/1/ 2019: Dr Cornell  CONCLUSIONS:   1. Successful angioplasty and stenting to critical stenosis in mid LAD, drug-eluting stent.   2. Successful angioplasty and stenting to severe stenosis in distal circumflex, drug-eluting stent.   3. Successful angioplasty and stenting to critical stenosis in mid circumflex, drug-eluting stent.   4. Normal LV function, EF 60%.   5. Aggressive secondary prevention is indicated.   CPT Codes:  [id number] Enio Cornell MD  Performing Physician  Electronically signed by [id number] Enio Cornell MD on 7/1/2019 at 10:19:33 AM  Patient Specialist/PCP: cardiology Dr. Cornell    Visit Vitals  /74    Pulse 90   Temp 36.3 °C (97.3 °F) (Temporal)   Resp 16   Ht 1.829 m (6')   Wt 103 kg (227 lb 1.2 oz)   SpO2 94%   BMI 30.80 kg/m²   Smoking Status Every Day   BSA 2.29 m²       DASI Risk Score      Flowsheet Row Pre-Admission Testing from 6/12/2025 in [facility] [place]  Most recent reading at 6/12/2025 10:36 AM Questionnaire Series Submission from 6/12/2025 in [facility] OR with Generic Provider Samanta  Most recent reading at 6/12/2025  8:20 AM   Can you take care of yourself (eat, dress, bathe, or use toilet)?  2.75 filed at 06/12/2025 1036 2.75  filed at 06/12/2025 0820   Can you walk indoors, such as around your house? 1.75 filed at 06/12/2025 1036 1.75  filed at 06/12/2025 0820   Can you walk a block or two on level ground?  2.75 filed at 06/12/2025 1036 2.75  filed at 06/12/2025 0820   Can you climb a flight of stairs or walk up a hill? 5.5 filed at 06/12/2025 1036 5.5  filed at 06/12/2025 0820   Can you run a short distance? 8 filed at 06/12/2025 1036 8  filed at 06/12/2025 0820   Can you do light work around the house like dusting or washing dishes? 2.7 filed at 06/12/2025 1036 2.7  filed at 06/12/2025 0820   Can you do moderate work around the house like vacuuming, sweeping floors or carrying groceries? 3.5 filed at 06/12/2025 1036 3.5  filed at 06/12/2025 0820   Can you do heavy work around the house like scrubbing floors or lifting and moving heavy furniture?  8 filed at 06/12/2025 1036 8  filed at 06/12/2025 0820   Can you do yard work like raking leaves, weeding or pushing a mower? 4.5 filed at 06/12/2025 1036 4.5  filed at 06/12/2025 0820   Can you have sexual relations? 0 filed at 06/12/2025 1036 0  filed at 06/12/2025 0820   Can you participate in moderate recreational activities like golf, bowling, dancing, doubles tennis or throwing a baseball or football? 0 filed at 06/12/2025 1036 6  filed at 06/12/2025 0820   Can you participate in strenous sports like swimming,  singles tennis, football, basketball, or skiing? 0 filed at 06/12/2025 1036 0  filed at 06/12/2025 0820   DASI SCORE 39.45 filed at 06/12/2025 1036 45.45  filed at 06/12/2025 0820   METS Score (Will be calculated only when all the questions are answered) 7.6 filed at 06/12/2025 1036 8.3  filed at 06/12/2025 0820          Caprini DVT Assessment      Flowsheet Row Pre-Admission Testing from 6/12/2025 in [facility] [place]   DVT Score (IF A SCORE IS NOT CALCULATING, MUST SELECT A BMI TO COMPLETE) 10 filed at 06/12/2025 1034   Medical Factors Present cancer, chemotherapy, or previous malignancy filed at 06/12/2025 1034   Surgical Factors Major surgery planned, lasting over 3 hours filed at 06/12/2025 1034   BMI (BMI MUST BE CHOSEN) 30 or less filed at 06/12/2025 1034          Modified Frailty Index      Flowsheet Row Pre-Admission Testing from 6/12/2025 in [facility] [place]   Non-independent functional status (problems with dressing, bathing, personal grooming, or cooking) 0 filed at 06/12/2025 1036   History of diabetes mellitus  0.0909 filed at 06/12/2025 1036   History of COPD 0 filed at 06/12/2025 1036   History of CHF No filed at 06/12/2025 1036   History of MI 0 filed at 06/12/2025 1036   History of Percutaneous Coronary Intervention, Cardiac Surgery, or Angina 0.0909 filed at 06/12/2025 1036   Hypertension requiring the use of medication  0.0909 filed at 06/12/2025 1036   Peripheral vascular disease 0 filed at 06/12/2025 1036   Impaired sensorium (cognitive impairement or loss, clouding, or delirium) 0 filed at 06/12/2025 1036   TIA or CVA withouy residual deficit 0 filed at 06/12/2025 1036   Cerebrovascular accident with deficit 0 filed at 06/12/2025 1036   Modified Frailty Index Calculator .2727 filed at 06/12/2025 1036          [id number] Stroke Risk Points  Current as of just now        N/A 0 to 9 Points:      Last Change: N/A          The [id number] risk score (Lip GH, et al. 2009. ©  2010 American [employer]) quantifies the risk of stroke for a patient with atrial fibrillation. For patients without atrial fibrillation or under the age of 18 this score appears as N/A. Higher score values generally indicate higher risk of stroke.        This score is not applicable to this patient. Components are not calculated.          Revised Cardiac Risk Index      Flowsheet Row Pre-Admission Testing from 6/12/2025 in [facility] [place]   High-Risk Surgery (Intraperitoneal, Intrathoracic,Suprainguinal vascular) 0 filed at 06/12/2025 1036   History of ischemic heart disease (History of MI, History of positive exercuse test, Current chest paint considered due to myocardial ischemia, Use of nitrate therapy, ECG with pathological Q Waves) 0 filed at 06/12/2025 1036   History of congestive heart failure (pulmonary edemia, bilateral rales or S3 gallop, Paroxysmal nocturnal dyspnea, CXR showing pulmonary vascular redistribution) 0 filed at 06/12/2025 1036   History of cerebrovascular disease (Prior TIA or stroke) 0 filed at 06/12/2025 1036   Pre-operative insulin treatment 1 filed at 06/12/2025 1036   Pre-operative creatinine>2 mg/dl 0 filed at 06/12/2025 1036   Revised Cardiac Risk Calculator 1 filed at 06/12/2025 1036          Apfel Simplified Score      Flowsheet Row Pre-Admission Testing from 6/12/2025 in [facility] [place]   Smoking status 0 filed at 06/12/2025 1036   History of motion sickness or PONV  0 filed at 06/12/2025 1036   Use of postoperative opioids 1 filed at 06/12/2025 1036   Gender - Female 0=No filed at 06/12/2025 1036   Apfel Simplified Score Calculator 1 filed at 06/12/2025 1036          Risk Analysis Index Results This Encounter         6/12/2025  1037             Do you live in a place other than your own home?: 0    When did you begin living in the place you are currently residing?: Greater than one year ago    Any kidney failure, kidney not working well, or  seeing a kidney doctor (nephrologist)? If yes, was this for kidney stones or another problem?: 0 No    Any history of chronic (long-term) congestive heart failure (CHF)?: 0 No    Any shortness of breath when resting?: 0 No    In the past five years, have you been diagnosed with or treated for cancer?: Yes    During the last 3 months has it become difficult for you to remember things or organize your thoughts?: 0 No    Have you lost weight of 10 pounds or more in the past 3 months without trying?: 0 No    Do you have any loss of appetitie?: 0 No    Getting Around (Mobility): 0 Can get around without help    Eatin Can plan and prepare own meals    Toiletin Can use toilet without any help    Personal Hygiene (Bathing, Hand Washing, Changing Clothes): 0 Can shower or bathe without any help    KHALIL Cancer History: Patient indicates history of cancer    Total Risk Analysis Index Score Without Cancer: 21    Total Risk Analysis Index Score: 36          Stop Bang Score      Flowsheet Row Pre-Admission Testing from 2025 in [facility]  Most recent reading at 2025 10:35 AM Questionnaire Series Submission from 2025 in [facility] OR with Generic Provider [facility]  Most recent reading at 2025  8:20 AM   Do you snore loudly? 0 filed at 2025 1035 0  filed at 2025 0820   Do you often feel tired or fatigued after your sleep? 0 filed at 2025 1035 0  filed at 2025 0820   Has anyone ever observed you stop breathing in your sleep? 0 filed at 2025 1035 0  filed at 2025 0820   Do you have or are you being treated for high blood pressure? 1 filed at 2025 1035 0  filed at 2025 0820   Recent BMI (Calculated) 30.8 filed at 2025 30.4  filed at 2025 0820   Is BMI greater than 35 kg/m2? 0=No filed at 2025 0=No  filed at 2025 0820   Age older than 50 years old? 1=Yes filed at 2025 1035 1=Yes  filed at  06/12/2025 0820   Is your neck circumference greater than 17 inches (Male) or 16 inches (Female)? 0 filed at 06/12/2025 1035 --   Gender - Male 1=Yes filed at 06/12/2025 1035 1=Yes  filed at 06/12/2025 0820   STOP-BANG Total Score 3 filed at 06/12/2025 1035 --          Prodigy: High Risk  Total Score: 16              Prodigy Age Score      Prodigy Gender Score          ARISCAT Score for Postoperative Pulmonary Complications      Flowsheet Row [place] [facility]   Age Calculated Score 3 filed at 06/12/2025 1037   Preoperative SpO2 8 filed at 06/12/2025 1037   Respiratory infection in the last month Either upper or lower (i.e., URI, bronchitis, pneumonia), with fever and antibiotic treatment 0 filed at 06/12/2025 1037   Preoperative anemia (Hgb less than 10 g/dl) 0 filed at 06/12/2025 1037   Surgical incision  0 filed at 06/12/2025 1037   Duration of surgery  23 filed at 06/12/2025 1037   Emergency Procedure  0 filed at 06/12/2025 1037   ARISCAT Total Score  34 filed at 06/12/2025 1037          Aguiar Perioperative Risk for Myocardial Infarction or Cardiac Arrest (LOLA)      Flowsheet Row [place]-Ad[place] [facility]   Calculated Age Score 1.28 filed at 06/12/2025 1037   Functional Status  0 filed at 06/12/2025 1037   ASA Class  -3.29 filed at 06/12/2025 1037   Creatinine 0 filed at 06/12/2025 1037   Type of Procedure  0.71 filed at 06/12/2025 1037   LOLA Total Score  -6.55 filed at 06/12/2025 1037   LOLA % 0.14 filed at 06/12/2025 1037        Assessment and Plan:     Plan for OR on 6/20 for        EXCISION, PAROTID GLAND [54918 (CPT®)] Right General   Right parotidectomy with nerve monitor     Due to parotid mass  CBC, BMP, ECG    HEENT/Airway:    See surgical plan    Cardiovascular:   CAD s/p PTCA and stenting  Follows with Dr. Cornell  Requesting most recent ECHO stress test and office note  Pt states he completed testing recently  and has cardiac clearance  DASI  Duke Activity Status Index (DASI)  DASI Score: 39.45   MET Score: 7.6  RCRI: 1 point, 6.0% risk for postoperative MACE   LOLA: 0.14% risk for postoperative MACE      Pulmonary:    The patient is current tobacco user.  Advised to quit smoking to improve overall health and to decrease risks for anesthesia related complications as well as postoperative wound healing complications.  Smoking cessation educational handout provided to patient during appointment.    Preoperative deep breathing educational handout provided to patient.  ARISCAT: 34     points which is a intermediate (13.3%) risk of in-hospital post-op pulmonary complications   STOP BANG:  3   points which is a intermediate risk for moderate to severe ISABEL    Renal:   Hx of prostate CA follows with heme/onc and radiation oncology    Endocrine:    IDDM  In insulin pt reports A1C has come down from original of 11  Last A1C 7.0    Hematologic:   no significant findings on chart review or clinical presentation  Preoperative DVT educational handout provided to patient.  Caprini Score: 10   points which is a highest risk of perioperative VTE    Gastrointestinal:   no significant findings on chart review or clinical presentation  Apfel: 1 points 21% risk for post operative N/V    Infectious disease:  no significant findings on chart review or clinical presentation     Musculoskeletal:    No acute active issues    Neuro:      Pt denies hx of seizure or stroke  Neuropathy left foot    Preoperative brain exercise educational handout provided to patient.  The patient is at an increased risk for perioperative stroke secondary to cardiac disease, HTN, HLD, DM , and general anesthesia.     I spent a total of 30   minutes on the date of the service which included preparing to see the patient, face-to-face patient care,  performing a physical examination, completing clinical documentation, discussing assessment and plan with the  patient/family/caregiver, educating the patient/family/caregiver on pre-operative medication instructions and ordering tests if applicable.               [1]   Past Medical History:  Diagnosis Date    CAD (coronary artery disease)     DM2 (diabetes mellitus, type 2) (Multi)     HLD (hyperlipidemia)     HTN (hypertension)     Osteoarthritis     Parotid mass     Prostate cancer (Multi)    [2]   Past Surgical History:  Procedure Laterality Date    CORONARY STENT PLACEMENT      MENISCECTOMY      PROSTATECTOMY     [3]   Family History  Problem Relation Name Age of Onset    Diabetes Mother      Pancreatic cancer Father      Diabetes Father     [4]   Allergies  Allergen Reactions    Gabapentin Headache and Dizziness

## 2025-06-12 NOTE — PREPROCEDURE INSTRUCTIONS
Thank you for visiting Preadmission Testing at [facility]. If you have any changes to your health condition, please call the SURGEON's office to alert them and give them details of your symptoms.        Preoperative Brain Exercises    What are brain exercises?  A brain exercise is any activity that engages your thinking (cognitive) skills.    What types of activities are considered brain exercises?  Jigsaw puzzles, crossword puzzles, word jumble, memory games, word search, and many more.  Many can be found free online or on your phone via a mobile jessica.    Why should I do brain exercises before my surgery?  More recent research has shown brain exercise before surgery can lower the risk of postoperative delirium (confusion) which can be especially important for older adults.  Patients who did brain exercises for 5 to 10 hours the days before surgery, cut their risk of postoperative delirium in half up to 1 week after surgery.      Preoperative Deep Breathing Exercises    Why it is important to do deep breathing exercises before my surgery?  Deep breathing exercises strengthen your breathing muscles.  This helps you to recover after your surgery and decreases the chance of breathing complications.    How are the deep breathing exercises done?  Sit straight with your back supported.  Breathe in deeply and slowly through your nose. Your lower rib cage should expand and your abdomen may move forward.  Hold that breath for 3 to 5 seconds.  Breathe out through pursed lips, slowly and completely.  Rest and repeat 10 times every hour while awake.  Rest longer if you become dizzy or lightheaded.      Patient and Family Education   Ways You Can Help Prevent Blood Clots     This handout explains some simple things you can do to help prevent blood clots.      Blood clots are blockages that can form in the body's veins. When a blood clot forms in your deep veins, it may be called a deep vein thrombosis, or DVT for short. Blood clots  can happen in any part of the body where blood flows, but they are most common in the arms and legs. If a piece of a blood clot breaks free and travels to the lungs, it is called a pulmonary embolus (PE). A PE can be a very serious problem.      Being in the hospital or having surgery can raise your chances of getting a blood clot because you may not be well enough to move around as much as you normally do.      Ways you can help prevent blood clots in the hospital         Wearing SCDs. SCDs stands for Sequential Compression Devices.   SCDs are special sleeves that wrap around your legs  They attach to a pump that fills them with air to gently squeeze your legs every few minutes.   This helps return the blood in your legs to your heart.   SCDs should only be taken off when walking or bathing.   SCDs may not be comfortable, but they can help save your life.               Wearing compression stockings - if your doctor orders them. These special snug fitting stockings gently squeeze your legs to help blood flow.       Walking. Walking helps move the blood in your legs.   If your doctor says it is ok, try walking the halls at least   5 times a day. Ask us to help you get up, so you don't fall.      Taking any blood thinning medicines your doctor orders.          ©[facility]; 3/23        Ways you can help prevent blood clots at home       Wearing compression stockings - if your doctor orders them. ? Walking - to help move the blood in your legs.       Taking any blood thinning medicines your doctor orders.      Signs of a blood clot or PE      Tell your doctor or nurse know right away if you have of the problems listed below.    If you are at home, seek medical care right away. Call 911 for chest pain or problems breathing.          Signs of a blood clot (DVT) - such as pain,  swelling, redness or warmth in your arm or leg      Signs of a pulmonary embolism (PE) - such as chest     pain or feeling short of  breath                             Patient and Family Education Quitting Smoking or Tobacco       Recognizing Dangerous Situations:   Alcohol use during the first month after quitting   Being around smoke or someone who smokes    Times situation routinely smoked   Triggers: car, breaks, coffee, when awakening, social events     Coping Skills:   Learning new ways to manage stress   Exercising   Relaxation breathing   Change routines   Distraction techniques     Websites:   Smoke-Free - offers free text messages and an jessica to help you quit. Info includes eating and mood issues that may come with quitting. There is a Live Helpline to talk to an expert. Go to smokefree.gov     Become an Ex-Smoker - the free EX Plan is based on scientific research and useful advice from ex-smokers. It isn't just about quitting smoking. It's about re-learning life without cigarettes using a 3-step program. Go to becomeanex.TargetingMantra     Centers for Disease Control - offer many suggestions for helping you quit. Includes a Quit Guide and real-life stories. There are sections for specific groups such as LGBT, , different ethnic groups, and pregnant women. Go to cdc.gov/tobacco/campaign/tips     Other Resources:     [place] Tobacco Quit Line - call 1-800-QUIT-NOW or 1-204.238.7489.   Hyphen 8 2-1-1 - to find local programs and resources. Call 211 or go to 211.TargetingMantra. [facility] Tobacco Cessation Program - call 202-321-3772.   American Lung Association - offers classes for quitting smoking. Some places may charge a fee. For a list of classes, go to lung.org or call 7-344-LUNG-USA.     Some things to think about:   The health benefits of quitting smoking can help most of the major parts of your body.   There is no safe amount of cigarette smoke. Quitting smoking can add years to your life.   When you quit, you'll also protect your loved ones from dangerous secondhand smoke.   Make a plan, join a support group, and talk to your physician  to assist in quitting smoking.     ©[facility], 11/20; PI-602 (6/22)

## 2025-06-12 NOTE — PREPROCEDURE INSTRUCTIONS
"   Medication List            Accurate as of June 12, 2025 11:15 AM. Always use your most recent med list.                atorvastatin 40 mg tablet  Commonly known as: Lipitor  Medication Adjustments for Surgery: Take/Use as prescribed     BD Ultra-Fine Mini Pen Needle 31 gauge x 3/16\" needle  Generic drug: pen needle, diabetic     cholecalciferol 1.25 mg (50,000 units) capsule  Commonly known as: Vitamin D-3  Additional Medication Adjustments for Surgery: Take last dose 7 days before surgery     cyclobenzaprine 10 mg tablet  Commonly known as: Flexeril  Medication Adjustments for Surgery: Take/Use as prescribed     diazePAM 5 mg tablet  Commonly known as: Valium  Take 1 tablet (5 mg) by mouth 1 time for 1 dose. Take prior to PET scan     glimepiride 4 mg tablet  Commonly known as: Amaryl  Medication Adjustments for Surgery: Take last dose 1 day (24 hours) before surgery     HumaLOG KwikPen Insulin 100 unit/mL pen  Generic drug: insulin lispro  Medication Adjustments for Surgery: Do Not take on the morning of surgery     metFORMIN 500 mg tablet  Commonly known as: Glucophage  Medication Adjustments for Surgery: Take last dose 1 day (24 hours) before surgery     nitroglycerin 0.4 mg SL tablet  Commonly known as: Nitrostat  Medication Adjustments for Surgery: Take/Use as prescribed     omega-3 1,000 mg capsule capsule  Additional Medication Adjustments for Surgery: Take last dose 7 days before surgery     Plavix 75 mg tablet  Generic drug: clopidogrel  Notes to patient: Discuss with ordering provider when to stop before surgery     Semglee(insulin glarg-yfgn)Pen 100 unit/mL (3 mL) pen  Generic drug: insulin glargine-yfgn  Medication Adjustments for Surgery: Take/Use as prescribed  Notes to patient: Take usual dose at dinner / do not take the morning of surgery                                PRE-OPERATIVE INSTRUCTIONS    You will receive notification one business day prior to your procedure to confirm your arrival " time. It is important that you answer your phone and/or check your messages during this time. If you do not hear from the surgery center by 5 pm. the day before your procedure, please call [phone].     Please enter the building through the Outpatient entrance and take the elevator off the lobby to the 2nd floor then check in at the Outpatient Surgery desk on the 2nd floor.    INSTRUCTIONS:  Talk to your surgeon for instructions if you should stop your aspirin, blood thinner, or diabetes medicines.  DO NOT take any multivitamins or over the counter supplements for 7-10 days before surgery.  If not being admitted, you must have an adult immediately available to drive you home after surgery. We also highly recommend you have someone stay with you for the entire day and night of your surgery.  For children having surgery, a parent or legal guardian must accompany them to the surgery center. If this is not possible, please call [phone] to make additional arrangements.  For adults who are unable to consent or make medical decisions for themselves, a legal guardian or Power of  must accompany them to the surgery center. If this is not possible, please call [phone] to make additional arrangements.  Wear comfortable, loose fitting clothing.  All jewelry and piercings must be removed. If you are unable to remove an item or have a dermal piercing, please be sure to tell the nurse when you arrive for surgery.  Nail polish and make-up must be removed.  Avoid smoking or consuming alcohol for 24 hours before surgery.  To help prevent infection, please take a shower/bath and wash your hair the night before and/or morning of surgery (or follow other specific bathing instructions provided).    Preoperative Fasting Guidelines    Why must I stop eating and drinking near surgery time?  With sedation, food or liquid in your stomach can enter your lungs causing serious complications  Increases nausea and  vomiting    When do I need to stop eating and drinking before my surgery?  Do not eat any solid food after midnight the night before your surgery/procedure unless otherwise instructed by your surgeon.   If you take a GLP-1 medication (ex: Trulicity, Ozempic, Mounjaro, Zepbound, Bydyreon, Tanzeun, Saxenda, Victoza, Adlyxin, Rybelus) please follow other specific instructions provided regarding preoperative          fasting.  You may have up to 13.5 ounces of clear liquid until TWO hours before your instructed arrival time to the hospital.  This includes water, black tea/coffee, (no milk or cream) apple juice, and electrolyte drinks (Gatorade).   You may chew gum until TWO hours before your surgery/procedure         If applicable, notify your surgeons office immediately of any new skin changes that occur to the surgical limb.      If you have any questions or concerns, please call Pre-Admission Testing at [phone].     Home Preoperative Antibacterial Shower with Chlorhexidine gluconate (CHG)     What is a home preoperative antibacterial shower?  This shower is a way of cleaning the skin with a germ killing solution before surgery. The solution contains chlorhexidine gluconate, commonly known as CHG. CHG is a skin cleanser with germ killing ability. Let your doctor know if you are allergic to chlorhexidine.    Why do I need to take a preoperative antibacterial shower?  Skin is not sterile. It is best to try to make your skin as free of germs as possible before surgery. Proper cleansing with a germ killing soap before surgery can lower the number of germs on your skin. This helps to reduce the risk of infection at the surgical site. Following the instructions listed below will help you prepare your skin for surgery.    How do I use the solution?  Begin using your CHG soap the night before and again the morning of your procedure.   Do not shave the day before or day of surgery.  Remove all jewelry until after  surgery. Take off rings and take out all body-piercing jewelry.  Wash your face and hair with normal soap and shampoo before you use the CHG soap.  Apply the CHG solution to a clean wet washcloth. Move away from the water to avoid premature rinsing of the CHG soap as you are applying. Firmly lather your entire body from the neck down. Do not use CHG on your face, eyes, ears, or genitals.   Pay special attention to the area where your incisions will be located.  Do not scrub your skin too hard.  It is important to allow the CHG soap to sit on your skin for 3-5 minutes.  Rinse the solution off your body completely. Do not wash with your normal soap after the CHG soap solution.  Pat yourself dry with a clean, soft towel.  Do not apply powders, lotions or deodorants as these might block how the CHG soap works.   Dress in clean clothing.  Be sure to sleep with clean, freshly laundered sheets.  Be aware that CHG can cause stains on fabric. Rinse your washcloth and other linens that have contact with CHG completely. Use only non-chlorine detergents to launder the items used.

## 2025-06-20 ENCOUNTER — ANESTHESIA (OUTPATIENT)
Dept: OPERATING ROOM | Facility: HOSPITAL | Age: 65
End: 2025-06-20
Payer: COMMERCIAL

## 2025-06-20 ENCOUNTER — HOSPITAL ENCOUNTER (OUTPATIENT)
Facility: HOSPITAL | Age: 65
Setting detail: OUTPATIENT SURGERY
Discharge: HOME | End: 2025-06-20
Attending: OTOLARYNGOLOGY | Admitting: OTOLARYNGOLOGY
Payer: COMMERCIAL

## 2025-06-20 ENCOUNTER — PHARMACY VISIT (OUTPATIENT)
Dept: PHARMACY | Facility: CLINIC | Age: 65
End: 2025-06-20
Payer: COMMERCIAL

## 2025-06-20 ENCOUNTER — ANESTHESIA EVENT (OUTPATIENT)
Dept: OPERATING ROOM | Facility: HOSPITAL | Age: 65
End: 2025-06-20
Payer: COMMERCIAL

## 2025-06-20 VITALS
OXYGEN SATURATION: 94 % | TEMPERATURE: 96.8 F | BODY MASS INDEX: 30.07 KG/M2 | RESPIRATION RATE: 18 BRPM | SYSTOLIC BLOOD PRESSURE: 128 MMHG | HEIGHT: 72 IN | WEIGHT: 222 LBS | HEART RATE: 85 BPM | DIASTOLIC BLOOD PRESSURE: 64 MMHG

## 2025-06-20 DIAGNOSIS — G89.18 ACUTE POSTOPERATIVE PAIN: Primary | ICD-10-CM

## 2025-06-20 DIAGNOSIS — K11.8 PAROTID MASS: ICD-10-CM

## 2025-06-20 LAB
GLUCOSE BLD MANUAL STRIP-MCNC: 204 MG/DL (ref 74–99)
GLUCOSE BLD MANUAL STRIP-MCNC: 264 MG/DL (ref 74–99)
GLUCOSE BLD MANUAL STRIP-MCNC: 364 MG/DL (ref 74–99)

## 2025-06-20 PROCEDURE — 3700000001 HC GENERAL ANESTHESIA TIME - INITIAL BASE CHARGE: Performed by: OTOLARYNGOLOGY

## 2025-06-20 PROCEDURE — 2500000004 HC RX 250 GENERAL PHARMACY W/ HCPCS (ALT 636 FOR OP/ED): Mod: JZ | Performed by: ANESTHESIOLOGY

## 2025-06-20 PROCEDURE — 88307 TISSUE EXAM BY PATHOLOGIST: CPT | Mod: TC,STJLAB | Performed by: OTOLARYNGOLOGY

## 2025-06-20 PROCEDURE — 7100000009 HC PHASE TWO TIME - INITIAL BASE CHARGE: Performed by: OTOLARYNGOLOGY

## 2025-06-20 PROCEDURE — 2500000005 HC RX 250 GENERAL PHARMACY W/O HCPCS: Performed by: ANESTHESIOLOGY

## 2025-06-20 PROCEDURE — 42415 EXCISE PAROTID GLAND/LESION: CPT | Performed by: OTOLARYNGOLOGY

## 2025-06-20 PROCEDURE — 2500000004 HC RX 250 GENERAL PHARMACY W/ HCPCS (ALT 636 FOR OP/ED): Mod: JW | Performed by: OTOLARYNGOLOGY

## 2025-06-20 PROCEDURE — 7100000010 HC PHASE TWO TIME - EACH INCREMENTAL 1 MINUTE: Performed by: OTOLARYNGOLOGY

## 2025-06-20 PROCEDURE — 3600000008 HC OR TIME - EACH INCREMENTAL 1 MINUTE - PROCEDURE LEVEL THREE: Performed by: OTOLARYNGOLOGY

## 2025-06-20 PROCEDURE — 82947 ASSAY GLUCOSE BLOOD QUANT: CPT

## 2025-06-20 PROCEDURE — 3700000002 HC GENERAL ANESTHESIA TIME - EACH INCREMENTAL 1 MINUTE: Performed by: OTOLARYNGOLOGY

## 2025-06-20 PROCEDURE — 7100000001 HC RECOVERY ROOM TIME - INITIAL BASE CHARGE: Performed by: OTOLARYNGOLOGY

## 2025-06-20 PROCEDURE — 2500000005 HC RX 250 GENERAL PHARMACY W/O HCPCS: Performed by: OTOLARYNGOLOGY

## 2025-06-20 PROCEDURE — 2720000007 HC OR 272 NO HCPCS: Performed by: OTOLARYNGOLOGY

## 2025-06-20 PROCEDURE — 2500000002 HC RX 250 W HCPCS SELF ADMINISTERED DRUGS (ALT 637 FOR MEDICARE OP, ALT 636 FOR OP/ED): Performed by: ANESTHESIOLOGY

## 2025-06-20 PROCEDURE — RXMED WILLOW AMBULATORY MEDICATION CHARGE

## 2025-06-20 PROCEDURE — 88307 TISSUE EXAM BY PATHOLOGIST: CPT | Performed by: STUDENT IN AN ORGANIZED HEALTH CARE EDUCATION/TRAINING PROGRAM

## 2025-06-20 PROCEDURE — 3600000003 HC OR TIME - INITIAL BASE CHARGE - PROCEDURE LEVEL THREE: Performed by: OTOLARYNGOLOGY

## 2025-06-20 PROCEDURE — 7100000002 HC RECOVERY ROOM TIME - EACH INCREMENTAL 1 MINUTE: Performed by: OTOLARYNGOLOGY

## 2025-06-20 PROCEDURE — 2500000004 HC RX 250 GENERAL PHARMACY W/ HCPCS (ALT 636 FOR OP/ED): Performed by: ANESTHESIOLOGIST ASSISTANT

## 2025-06-20 RX ORDER — OXYCODONE HYDROCHLORIDE 5 MG/1
5 TABLET ORAL EVERY 4 HOURS PRN
Status: DISCONTINUED | OUTPATIENT
Start: 2025-06-20 | End: 2025-06-20 | Stop reason: HOSPADM

## 2025-06-20 RX ORDER — GLYCOPYRROLATE 0.2 MG/ML
INJECTION INTRAMUSCULAR; INTRAVENOUS AS NEEDED
Status: DISCONTINUED | OUTPATIENT
Start: 2025-06-20 | End: 2025-06-20

## 2025-06-20 RX ORDER — DIPHENHYDRAMINE HYDROCHLORIDE 50 MG/ML
12.5 INJECTION, SOLUTION INTRAMUSCULAR; INTRAVENOUS ONCE AS NEEDED
Status: DISCONTINUED | OUTPATIENT
Start: 2025-06-20 | End: 2025-06-20 | Stop reason: HOSPADM

## 2025-06-20 RX ORDER — FENTANYL CITRATE 50 UG/ML
INJECTION, SOLUTION INTRAMUSCULAR; INTRAVENOUS AS NEEDED
Status: DISCONTINUED | OUTPATIENT
Start: 2025-06-20 | End: 2025-06-20

## 2025-06-20 RX ORDER — ONDANSETRON HYDROCHLORIDE 2 MG/ML
INJECTION, SOLUTION INTRAVENOUS AS NEEDED
Status: DISCONTINUED | OUTPATIENT
Start: 2025-06-20 | End: 2025-06-20

## 2025-06-20 RX ORDER — LIDOCAINE HYDROCHLORIDE 20 MG/ML
INJECTION, SOLUTION INFILTRATION; PERINEURAL AS NEEDED
Status: DISCONTINUED | OUTPATIENT
Start: 2025-06-20 | End: 2025-06-20

## 2025-06-20 RX ORDER — MIDAZOLAM HYDROCHLORIDE 1 MG/ML
INJECTION, SOLUTION INTRAMUSCULAR; INTRAVENOUS AS NEEDED
Status: DISCONTINUED | OUTPATIENT
Start: 2025-06-20 | End: 2025-06-20

## 2025-06-20 RX ORDER — LIDOCAINE HYDROCHLORIDE 10 MG/ML
0.1 INJECTION, SOLUTION INFILTRATION; PERINEURAL ONCE
Status: DISCONTINUED | OUTPATIENT
Start: 2025-06-20 | End: 2025-06-20 | Stop reason: HOSPADM

## 2025-06-20 RX ORDER — ALBUTEROL SULFATE 0.83 MG/ML
2.5 SOLUTION RESPIRATORY (INHALATION) ONCE AS NEEDED
Status: COMPLETED | OUTPATIENT
Start: 2025-06-20 | End: 2025-06-20

## 2025-06-20 RX ORDER — ACETAMINOPHEN 325 MG/1
975 TABLET ORAL ONCE
Status: DISCONTINUED | OUTPATIENT
Start: 2025-06-20 | End: 2025-06-20 | Stop reason: HOSPADM

## 2025-06-20 RX ORDER — CEFAZOLIN SODIUM 2 G/100ML
INJECTION, SOLUTION INTRAVENOUS AS NEEDED
Status: DISCONTINUED | OUTPATIENT
Start: 2025-06-20 | End: 2025-06-20

## 2025-06-20 RX ORDER — FAMOTIDINE 10 MG/ML
20 INJECTION, SOLUTION INTRAVENOUS ONCE
Status: DISCONTINUED | OUTPATIENT
Start: 2025-06-20 | End: 2025-06-20 | Stop reason: HOSPADM

## 2025-06-20 RX ORDER — PROPOFOL 10 MG/ML
INJECTION, EMULSION INTRAVENOUS AS NEEDED
Status: DISCONTINUED | OUTPATIENT
Start: 2025-06-20 | End: 2025-06-20

## 2025-06-20 RX ORDER — SUCCINYLCHOLINE CHLORIDE 20 MG/ML INJECTION SOLUTION
SOLUTION AS NEEDED
Status: DISCONTINUED | OUTPATIENT
Start: 2025-06-20 | End: 2025-06-20

## 2025-06-20 RX ORDER — METOCLOPRAMIDE HYDROCHLORIDE 5 MG/ML
10 INJECTION INTRAMUSCULAR; INTRAVENOUS ONCE AS NEEDED
Status: DISCONTINUED | OUTPATIENT
Start: 2025-06-20 | End: 2025-06-20 | Stop reason: HOSPADM

## 2025-06-20 RX ORDER — HYDRALAZINE HYDROCHLORIDE 20 MG/ML
5 INJECTION INTRAMUSCULAR; INTRAVENOUS EVERY 30 MIN PRN
Status: DISCONTINUED | OUTPATIENT
Start: 2025-06-20 | End: 2025-06-20 | Stop reason: HOSPADM

## 2025-06-20 RX ORDER — TRAMADOL HYDROCHLORIDE 50 MG/1
50 TABLET, FILM COATED ORAL EVERY 6 HOURS PRN
Qty: 12 TABLET | Refills: 0 | Status: SHIPPED | OUTPATIENT
Start: 2025-06-20 | End: 2025-06-23

## 2025-06-20 RX ORDER — SODIUM CHLORIDE, SODIUM LACTATE, POTASSIUM CHLORIDE, CALCIUM CHLORIDE 600; 310; 30; 20 MG/100ML; MG/100ML; MG/100ML; MG/100ML
100 INJECTION, SOLUTION INTRAVENOUS CONTINUOUS
Status: DISCONTINUED | OUTPATIENT
Start: 2025-06-20 | End: 2025-06-20 | Stop reason: HOSPADM

## 2025-06-20 RX ORDER — PHENYLEPHRINE HCL IN 0.9% NACL 0.4MG/10ML
SYRINGE (ML) INTRAVENOUS AS NEEDED
Status: DISCONTINUED | OUTPATIENT
Start: 2025-06-20 | End: 2025-06-20

## 2025-06-20 RX ORDER — LABETALOL HYDROCHLORIDE 5 MG/ML
5 INJECTION, SOLUTION INTRAVENOUS ONCE AS NEEDED
Status: DISCONTINUED | OUTPATIENT
Start: 2025-06-20 | End: 2025-06-20 | Stop reason: HOSPADM

## 2025-06-20 RX ORDER — PHENYLEPHRINE 10 MG/250 ML(40 MCG/ML)IN 0.9 % SOD.CHLORIDE INTRAVENOUS
CONTINUOUS PRN
Status: DISCONTINUED | OUTPATIENT
Start: 2025-06-20 | End: 2025-06-20

## 2025-06-20 RX ORDER — HYDROMORPHONE HYDROCHLORIDE 0.2 MG/ML
0.2 INJECTION INTRAMUSCULAR; INTRAVENOUS; SUBCUTANEOUS EVERY 5 MIN PRN
Status: DISCONTINUED | OUTPATIENT
Start: 2025-06-20 | End: 2025-06-20 | Stop reason: HOSPADM

## 2025-06-20 RX ORDER — OXYCODONE AND ACETAMINOPHEN 5; 325 MG/1; MG/1
1 TABLET ORAL EVERY 4 HOURS PRN
Status: DISCONTINUED | OUTPATIENT
Start: 2025-06-20 | End: 2025-06-20 | Stop reason: HOSPADM

## 2025-06-20 RX ORDER — DEXAMETHASONE SODIUM PHOSPHATE 10 MG/ML
INJECTION INTRAMUSCULAR; INTRAVENOUS AS NEEDED
Status: DISCONTINUED | OUTPATIENT
Start: 2025-06-20 | End: 2025-06-20

## 2025-06-20 RX ORDER — BACITRACIN ZINC 500 UNIT/G
OINTMENT IN PACKET (EA) TOPICAL AS NEEDED
Status: DISCONTINUED | OUTPATIENT
Start: 2025-06-20 | End: 2025-06-20 | Stop reason: HOSPADM

## 2025-06-20 RX ORDER — ONDANSETRON HYDROCHLORIDE 2 MG/ML
4 INJECTION, SOLUTION INTRAVENOUS ONCE AS NEEDED
Status: COMPLETED | OUTPATIENT
Start: 2025-06-20 | End: 2025-06-20

## 2025-06-20 RX ADMIN — Medication 100 MCG: at 08:30

## 2025-06-20 RX ADMIN — INSULIN HUMAN 10 UNITS: 100 INJECTION, SOLUTION PARENTERAL at 11:41

## 2025-06-20 RX ADMIN — LIDOCAINE HYDROCHLORIDE 100 MG: 20 INJECTION, SOLUTION INFILTRATION; PERINEURAL at 07:30

## 2025-06-20 RX ADMIN — Medication 2 L/MIN: at 09:30

## 2025-06-20 RX ADMIN — ONDANSETRON 4 MG: 2 INJECTION INTRAMUSCULAR; INTRAVENOUS at 08:45

## 2025-06-20 RX ADMIN — ONDANSETRON 4 MG: 2 INJECTION INTRAMUSCULAR; INTRAVENOUS at 09:49

## 2025-06-20 RX ADMIN — Medication 120 MG: at 07:30

## 2025-06-20 RX ADMIN — CEFAZOLIN SODIUM 2 G: 2 INJECTION, SOLUTION INTRAVENOUS at 07:38

## 2025-06-20 RX ADMIN — ALBUTEROL SULFATE 2.5 MG: 2.5 SOLUTION RESPIRATORY (INHALATION) at 10:20

## 2025-06-20 RX ADMIN — SODIUM CHLORIDE, POTASSIUM CHLORIDE, SODIUM LACTATE AND CALCIUM CHLORIDE: 600; 310; 30; 20 INJECTION, SOLUTION INTRAVENOUS at 07:24

## 2025-06-20 RX ADMIN — HYDROMORPHONE HYDROCHLORIDE 0.5 MG: 1 INJECTION, SOLUTION INTRAMUSCULAR; INTRAVENOUS; SUBCUTANEOUS at 09:23

## 2025-06-20 RX ADMIN — REMIFENTANIL HYDROCHLORIDE 0.1 MCG/KG/MIN: 1 INJECTION, POWDER, LYOPHILIZED, FOR SOLUTION INTRAVENOUS at 07:36

## 2025-06-20 RX ADMIN — PROPOFOL 50 MG: 10 INJECTION, EMULSION INTRAVENOUS at 07:35

## 2025-06-20 RX ADMIN — DEXAMETHASONE SODIUM PHOSPHATE 10 MG: 10 INJECTION INTRAMUSCULAR; INTRAVENOUS at 07:43

## 2025-06-20 RX ADMIN — FENTANYL CITRATE 100 MCG: 50 INJECTION, SOLUTION INTRAMUSCULAR; INTRAVENOUS at 07:30

## 2025-06-20 RX ADMIN — GLYCOPYRROLATE 0.2 MG: 0.2 INJECTION, SOLUTION INTRAMUSCULAR; INTRAVENOUS at 07:24

## 2025-06-20 RX ADMIN — MIDAZOLAM 2 MG: 1 INJECTION INTRAMUSCULAR; INTRAVENOUS at 07:24

## 2025-06-20 RX ADMIN — Medication 100 MCG: at 07:45

## 2025-06-20 RX ADMIN — Medication 0.4 MCG/KG/MIN: at 08:06

## 2025-06-20 RX ADMIN — Medication 200 MCG: at 08:01

## 2025-06-20 RX ADMIN — PROPOFOL 150 MG: 10 INJECTION, EMULSION INTRAVENOUS at 07:30

## 2025-06-20 SDOH — HEALTH STABILITY: MENTAL HEALTH: CURRENT SMOKER: 0

## 2025-06-20 ASSESSMENT — PAIN SCALES - GENERAL
PAINLEVEL_OUTOF10: 2
PAINLEVEL_OUTOF10: 4
PAINLEVEL_OUTOF10: 0 - NO PAIN
PAINLEVEL_OUTOF10: 8
PAINLEVEL_OUTOF10: 4
PAINLEVEL_OUTOF10: 5 - MODERATE PAIN
PAINLEVEL_OUTOF10: 4
PAINLEVEL_OUTOF10: 5 - MODERATE PAIN
PAINLEVEL_OUTOF10: 4
PAINLEVEL_OUTOF10: 4

## 2025-06-20 ASSESSMENT — PAIN - FUNCTIONAL ASSESSMENT
PAIN_FUNCTIONAL_ASSESSMENT: 0-10

## 2025-06-20 NOTE — OP NOTE
EXCISION, PAROTID GLAND (R) Operative Note     Date: 2025  OR Location: [facility] OR    Name: Serafin Salgado, : 1960, Age: 65 y.o., MRN: [id number], Sex: male    Diagnosis  Pre-op Diagnosis      * Parotid mass [K11.8] Post-op Diagnosis     * Parotid mass [K11.8]     Procedures  EXCISION, SALIVARY GLAND  04512 - CT EXC PRTD RAPHAEL/PRTD GLND LAT DSJ&PRSRV FACIAL NR      Surgeons      * Barrera Gomez - Primary    Resident/Fellow/Other Assistant:  Surgeons and Role:  * No surgeons found with a matching role *    Staff:   Circulator: Yanna  Scrub Person: Tania Soaresub Person: Gia    Anesthesia Staff: Anesthesiologist: Eloy Asencio MD  C-AA: ARTHUR Koroma    Procedure Summary  Anesthesia: General  ASA: ASA status not filed in the log.  Estimated Blood Loss: 10 mL  Intra-op Medications:   Administrations occurring from 0730 to 1120 on 25:   Medication Name Total Dose   lidocaine-epinephrine PF (Xylocaine W/EPI) 1 %-1:200,000 injection 10 mL   bacitracin ointment 1 Application   ceFAZolin (Ancef) IV 2 g in 100 mL dextrose (iso) - premix 2 g   dexAMETHasone (Decadron) 10 mg/mL 10 mg   fentaNYL (Sublimaze) injection 50 mcg/mL 100 mcg   LR bolus Cannot be calculated   lidocaine (Xylocaine) injection 2 % 100 mg   ondansetron (Zofran) 2 mg/mL injection 4 mg   phenylephrine (Aldo-Synephrine) 10 mg/250 mL NS (40 mcg/mL) infusion 2.9 mg   phenylephrine 40 mcg/mL syringe 10 mL 400 mcg   propofol (Diprivan) injection 10 mg/mL 200 mg   remifentanil (Ultiva) 1,000 mcg in sodium chloride 0.9% 50 mL (20 mcg/mL) infusion 0.56 mg   succinylcholine chloride injection syringe 200 mg/10 ml 120 mg              Anesthesia Record               Intraprocedure I/O Totals          Intake    LR bolus 500.00 mL    Remifentanil Drip 0.00 mL    The total shown is the total volume documented since Anesthesia Start was filed.    Phenylephrine Drip 0.00 mL    The total shown is the total volume documented since Anesthesia Start was  filed.    Total Intake 500 mL          Specimen:   ID Type Source Tests Collected by Time   1 : RIGHT PAROTID MASS STITCH ANTERIOR Tissue PAROTID RESECTION RIGHT SURGICAL PATHOLOGY EXAM Barrera Gomez MD 6/20/2025 0836   2 : PAROTID GLAND LATERAL TO MASS STITCH SUPERIOR Tissue PAROTID RESECTION RIGHT SURGICAL PATHOLOGY EXAM Barrera Gomez MD 6/20/2025 0882            Findings: Well encapsulated mass inferior to the lower division of the facial nerve.  Facial nerve stimulated at 0.5    Indications: Serafin Salgado is an 65 y.o. male who is having surgery for Parotid mass [K11.8].     The patient was seen in the preoperative area. The risks, benefits, complications, treatment options, non-operative alternatives, expected recovery and outcomes were discussed with the patient. The possibilities of reaction to medication, pulmonary aspiration, injury to surrounding structures, bleeding, recurrent infection, the need for additional procedures, failure to diagnose a condition, and creating a complication requiring transfusion or operation were discussed with the patient. The patient concurred with the proposed plan, giving informed consent.  The site of surgery was properly noted/marked if necessary per policy. The patient has been actively warmed in preoperative area. Preoperative antibiotics have been ordered and given within 1 hours of incision. Venous thrombosis prophylaxis have been ordered including bilateral sequential compression devices    Procedure Details: Patient was taken back to the operating room laid supine on the table.  Timeout was performed, general anesthesia induced, patient was intubated.  The table was turned.  The facial nerve monitor was applied percutaneously.  The circuit was connected and functioning appropriately.  A modified Kristopher incision was marked.  The face and neck were then prepped and draped in sterile fashion.  A 15 blade was used to make skin incision down into the subcutaneous fat.   The skin flap was elevated anteriorly over the parotid fascia.  The greater regular nerve was identified and we are able to preserve the posterior aspect.  The anterior aspect traveled towards the mass and was divided.  A branch of the external jugular vein was clipped and divided.  The posterior belly of the digastric was identified.  At this point I could identify the capsule of the mass.  The overlying parotid fascia was divided to allow better retraction.  The lower division of the facial nerve was identified and traced proximally and distally.  The mass abutted the inferior aspect and did not extend over the midface or superior divisions.  Therefore the capsule of the mass was followed and overlying the parotid and fascial attachments were divided.  The mass was removed and oriented for pathology.  There was also a portion of the superficial gland that was closely approximating the mass that was also removed and oriented.  There is no significant bleeding.  Fibrillar was placed in the wound bed.  Hemostasis was adequate.  The parotid fascia was reapproximated to the sternocleidomastoid muscle.  The incision was closed in layers using 3-0 Vicryl for the deep layer and running 5-0 fast for the skin.  The patient was extubated and had an episode of laryngospasm that was broken with mask ventilation.  He was taken to PACU in stable condition  Evidence of Infection: No   Complications:  None; patient tolerated the procedure well.    Disposition: PACU - hemodynamically stable.  Condition: stable         Barrera Gomez  Phone Number: [phone]

## 2025-06-20 NOTE — ANESTHESIA POSTPROCEDURE EVALUATION
Patient: Serafin Salgado    Procedure Summary       Date: 06/20/25 Room / Location: [facility] OR 07 / [facility] OR    Anesthesia Start: 0724 Anesthesia Stop:     Procedure: EXCISION, PAROTID GLAND (Right) Diagnosis:       Parotid mass      (Parotid mass [K11.8])    Surgeons: Barrera Gomez MD Responsible Provider: Eloy Asencio MD    Anesthesia Type: general ASA Status: 2            Anesthesia Type: general    Vitals Value Taken Time   /71 06/20/25 09:15   Temp 36.1 06/20/25 09:15   Pulse 90 06/20/25 09:15   Resp 16 06/20/25 09:15   SpO2 93 06/20/25 09:15       Anesthesia Post Evaluation    Patient location during evaluation: PACU  Patient participation: complete - patient participated  Level of consciousness: sleepy but conscious  Pain management: satisfactory to patient  Airway patency: patent  Cardiovascular status: acceptable  Respiratory status: acceptable and spontaneous ventilation  Hydration status: acceptable  Postoperative Nausea and Vomiting: none        There were no known notable events for this encounter.

## 2025-06-20 NOTE — DISCHARGE INSTRUCTIONS
DEPARTMENT OF OTOLARYNGOLOGY (ENT)  DISCHARGE INSTRUCTIONS    C O N F I D E N T I A L   I N F O R M A T I O N  -------------------------------------------------------------------------------------------------------------------    Serafin Salgado  [id number]    The following is a brief overview of your hospitalization. Some of the information contained on this summary may be confidential.  This information should be kept in your records and should be shared with your regular doctor.    Admission Date:6/20/2025  6:11 AM  Discharge Date: No discharge date for patient encounter.    Disposition:  home    PRINCIPAL DIAGNOSIS (reason after study for this admission): parotid mass    Physicians:   Dr. Gomez    Operations performed while hospitalized:   Right parotidectomy      Treatment/wound care:   Keep area(s) clean and dry.   It is okay to shower 24 hours after time of surgery.    Do not scrub wound(s), pat dry.  Apply vaseline twice daily  Do not submerge wound(s) in standing water until seen in followup (no tub bathing, swimming, or hot tubs).    Please visually inspect your wound(s) at least once daily.  If the wound(s) are in a difficult to see location, please use a mirror or have someone else assist with visual inspection.      Pain Control:    Adequate management:   Tramadol can be taken as prescribed as needed for breakthrough pain.    You can also take tylenol and ibuprofen for pain control    Activity after Discharge:   No heavy lifting, weight bearing as tolerated, no driving until mobility is returned to normal.   Do not submerge wound(s) in standing water for 7 days after surgery (no tub bathing, swimming, or hot tubs).   Do not drive or operate heavy machinery while taking narcotic pain medications as these medications can alter perception, impair judgement, and slow reaction times.    Diet: regular    Call Provider if:  Breathing faster than normal  Breathing harder than normal or having retractions  Fever of  100.4 (38 C) or higher  Temperature is greater than 102 degrees  Chills  Drinking less than normal  Not being able to go 4-6 hours between albuterol treatments  Urinating less than normal, over 1 day  Acting very sleepy and difficult to awaken  Vomiting (throwing up) and not able to eat or drink for 12 hours  3 or more loose, watery bowel movements in 24 hours (diarrhea)  Any new concerning symptoms

## 2025-06-20 NOTE — ANESTHESIA PREPROCEDURE EVALUATION
Patient: Serafin Salgado    Procedure Information       Date/Time: 06/20/25 0730    Procedure: EXCISION, PAROTID GLAND (Right) - Right parotidectomy with nerve monitor    Location: [facility] OR 07 / [facility]    Surgeons: Barrera Gomez MD            Relevant Problems   Cardiac   (+) Chest wall pain      Neuro   (+) Anxiety      /Renal   (+) BPH with obstruction/lower urinary tract symptoms   (+) Prostate cancer (Multi)      Musculoskeletal   (+) Osteoarthritis of right knee   (+) Other intervertebral disc degeneration, lumbar region   (+) Primary osteoarthritis of first carpometacarpal joint of right hand   (+) Spinal stenosis of lumbosacral region       Clinical information reviewed:   Tobacco  Allergies  Meds   Med Hx  Surg Hx   Fam Hx  Soc Hx        NPO Detail:  No data recorded     Physical Exam    Airway  Mallampati: II  TM distance: >3 FB  Neck ROM: full  Mouth opening: 3 or more finger widths     Cardiovascular   Rhythm: regular  Rate: normal     Dental - normal exam     Pulmonary Breath sounds clear to auscultation     Abdominal            Anesthesia Plan    History of general anesthesia?: yes  History of complications of general anesthesia?: no    ASA 2     general     The patient is not a current smoker.    intravenous induction   Anesthetic plan and risks discussed with patient.    Plan discussed with CAA.

## 2025-06-20 NOTE — ANESTHESIA PROCEDURE NOTES
Airway  Date/Time: 6/20/2025 7:32 AM  Reason: elective    Airway not difficult    Staffing  Performed: ARTHUR   Authorized by: Eloy Asencio MD    Performed by: ARTHUR Koroma  Patient location during procedure: OR    Patient Condition  Indications for airway management: anesthesia  Patient position: sniffing  Sedation level: deep     Final Airway Details   Preoxygenated: yes  Final airway type: endotracheal airway  Successful airway: ETT  Cuffed: yes   Successful intubation technique: direct laryngoscopy  Endotracheal tube insertion site: oral  Blade: Bereket  Blade size: #4  ETT size (mm): 7.0  Cormack-Lehane Classification: grade I - full view of glottis  Placement verified by: chest auscultation and capnometry   Measured from: lips  ETT to lips (cm): 22  Number of attempts at approach: 1  Number of other approaches attempted: 0

## 2025-06-27 ENCOUNTER — TELEPHONE (OUTPATIENT)
Facility: CLINIC | Age: 65
End: 2025-06-27
Payer: COMMERCIAL

## 2025-06-27 NOTE — TELEPHONE ENCOUNTER
Returned call to Serafin, he was concerned his incision line was red. No other signs of infection noted, (no warmth, swelling, new pain to the touch, or fever). He was able to send a picture via [facility], no concern from picture but will share with Dr. Gomez and update patient accordingly. Reviewed incision care with Serafin and he is scheduled for a pov on 7/1. No other questions or concerns.

## 2025-06-29 NOTE — PROGRESS NOTES
ENT Outpatient Consultation    Chief Complaint: Right parotid mass  History Of Present Illness  Serafin Salgado is a 65 y.o. male presents for evaluation of right sided parotid mass.  He was seen previously by Dr. Yarbrough in 2020.  FNA was obtained at that time showing cystic contents.  There was a significant amount of fluid that was extracted at the time of the FNA.  He has noticed a slow increase in size since that time.  He had a CT scan that was reviewed showing an increase in the cystic portion of the mass.  There are no concerning lymph nodes.  He is relatively asymptomatic with the exception of some mild tenderness when pressing on it    He has history of prostate cancer     Past Medical History  He has a past medical history of CAD (coronary artery disease), DM2 (diabetes mellitus, type 2) (Multi), HLD (hyperlipidemia), HTN (hypertension), Osteoarthritis, Parotid mass, and Prostate cancer (Multi).    Surgical History  He has a past surgical history that includes Prostatectomy; Coronary stent placement; and Meniscectomy.     Social History  He reports that he has been smoking cigarettes. He has a 20 pack-year smoking history. He has been exposed to tobacco smoke. He has never used smokeless tobacco. He reports that he does not currently use drugs. He reports that he does not drink alcohol.    Family History  Family History[1]     Allergies  Gabapentin     Physical Exam:  CONSTITUTIONAL:  No acute distress  VOICE:  No hoarseness or other abnormality  RESPIRATION:  Breathing comfortably, no stridor  CV:  No clubbing/cyanosis/edema in hands  EYES:  EOM intact, sclera normal  NEURO:  Alert and oriented times 3, Cranial nerves II-XII grossly intact and symmetric bilaterally  HEAD AND FACE:  Symmetric facial features, no masses or lesions, sinuses non-tender to palpation  SALIVARY GLANDS: Palpable mass within the right parotid gland.  Lesion is not firm.  There are no overlying skin changes  EARS:  Normal external ears,  external auditory canals, and TMs to otoscopy, normal hearing to whispered voice.  NOSE:  External nose midline, anterior rhinoscopy is normal with limited visualization to the anterior aspect of the interior turbinates, no bleeding or drainage, no lesions  ORAL CAVITY/OROPHARYNX/LIPS:  Normal mucous membranes, normal floor of mouth/tongue/OP, no masses or lesions  PHARYNGEAL WALLS:  No masses or lesions  NECK/LYMPH:  No palpable LAD, no thyroid masses, trachea midline  SKIN:  Neck skin is without scar or injury  PSYCH:  Alert and oriented with appropriate mood and affect     Last Recorded Vitals  There were no vitals taken for this visit.    Relevant Results  {If you would like to pull in Medications, type .meds     If you would like to pull in Lab results for the last 24 hours, type .[id number]    If you would like to pull in Imaging results, type .imgrslt :99}    No results found for this or any previous visit (from the past 24 hours).  Imaging  No results found.      Cardiology, Vascular, and Other Imaging  No other imaging results found for the past 7 days      Assessment and Plan  65 y.o. male with right sided parotid mass with increased cystic component over the last 4 years.  FNA was previously obtained and essentially showed cystic contents.  We discussed that most of these lesions are benign however since it has recurred and slightly increased in size would recommend parotidectomy for definitive management diagnosis.  We discussed procedure in detail including the risks of bleeding, infection, numbness, cranial neuropathies.  We discussed drain placement and expected postoperative course.  Patient is interested in proceeding with surgery and we will work on scheduling at his convenience    Barrera Gomez MD         [1]   Family History  Problem Relation Name Age of Onset    Diabetes Mother      Pancreatic cancer Father      Diabetes Father

## 2025-07-01 ENCOUNTER — APPOINTMENT (OUTPATIENT)
Facility: CLINIC | Age: 65
End: 2025-07-01
Payer: COMMERCIAL

## 2025-07-01 VITALS — BODY MASS INDEX: 29.8 KG/M2 | HEIGHT: 72 IN | WEIGHT: 220 LBS

## 2025-07-01 DIAGNOSIS — K11.8 PAROTID MASS: Primary | ICD-10-CM

## 2025-07-01 LAB
LABORATORY COMMENT REPORT: NORMAL
PATH REPORT.FINAL DX SPEC: NORMAL
PATH REPORT.GROSS SPEC: NORMAL
PATH REPORT.RELEVANT HX SPEC: NORMAL
PATH REPORT.TOTAL CANCER: NORMAL

## 2025-07-01 PROCEDURE — 3008F BODY MASS INDEX DOCD: CPT | Performed by: OTOLARYNGOLOGY

## 2025-07-01 PROCEDURE — 99024 POSTOP FOLLOW-UP VISIT: CPT | Performed by: OTOLARYNGOLOGY

## 2025-07-21 DIAGNOSIS — C61 PROSTATE CANCER (MULTI): ICD-10-CM

## 2025-07-31 ENCOUNTER — LAB (OUTPATIENT)
Dept: LAB | Facility: CLINIC | Age: 65
End: 2025-07-31
Payer: COMMERCIAL

## 2025-07-31 DIAGNOSIS — C61 PROSTATE CANCER (MULTI): ICD-10-CM

## 2025-07-31 LAB
PSA SERPL-MCNC: 0.93 NG/ML
TESTOST SERPL-MCNC: 408 NG/DL (ref 240–1000)

## 2025-07-31 PROCEDURE — 84153 ASSAY OF PSA TOTAL: CPT | Mod: PARLAB | Performed by: STUDENT IN AN ORGANIZED HEALTH CARE EDUCATION/TRAINING PROGRAM

## 2025-07-31 PROCEDURE — 84403 ASSAY OF TOTAL TESTOSTERONE: CPT | Mod: PARLAB

## 2025-07-31 PROCEDURE — 36415 COLL VENOUS BLD VENIPUNCTURE: CPT | Performed by: STUDENT IN AN ORGANIZED HEALTH CARE EDUCATION/TRAINING PROGRAM

## 2025-08-04 ENCOUNTER — HOSPITAL ENCOUNTER (OUTPATIENT)
Dept: RADIATION ONCOLOGY | Facility: CLINIC | Age: 65
Setting detail: RADIATION/ONCOLOGY SERIES
Discharge: HOME | End: 2025-08-04
Payer: COMMERCIAL

## 2025-08-04 VITALS
WEIGHT: 217.8 LBS | TEMPERATURE: 97 F | RESPIRATION RATE: 18 BRPM | BODY MASS INDEX: 29.54 KG/M2 | DIASTOLIC BLOOD PRESSURE: 64 MMHG | HEART RATE: 84 BPM | OXYGEN SATURATION: 95 % | SYSTOLIC BLOOD PRESSURE: 124 MMHG

## 2025-08-04 DIAGNOSIS — C61 PROSTATE CANCER (MULTI): Primary | ICD-10-CM

## 2025-08-04 PROCEDURE — 99213 OFFICE O/P EST LOW 20 MIN: CPT | Performed by: STUDENT IN AN ORGANIZED HEALTH CARE EDUCATION/TRAINING PROGRAM

## 2025-08-04 ASSESSMENT — ENCOUNTER SYMPTOMS
CONSTITUTIONAL NEGATIVE: 1
PSYCHIATRIC NEGATIVE: 1
RESPIRATORY NEGATIVE: 1
EYES NEGATIVE: 1
ENDOCRINE NEGATIVE: 1
GASTROINTESTINAL NEGATIVE: 1
MUSCULOSKELETAL NEGATIVE: 1
CARDIOVASCULAR NEGATIVE: 1
NEUROLOGICAL NEGATIVE: 1
HEMATOLOGIC/LYMPHATIC NEGATIVE: 1

## 2025-08-04 ASSESSMENT — PAIN SCALES - GENERAL: PAINLEVEL_OUTOF10: 0-NO PAIN

## 2025-08-04 NOTE — PROGRESS NOTES
Radiation Oncology Follow-Up    Patient Name:  Serafin Salgado  MRN:  [id number]  :  1960    Primary Care Provider: No Assigned PCP Generic Provider, MD  Care Team: Patient Care Team:  No Assigned Pcp Generic Provider, MD as PCP - General (General Practice)  Jluis Cooper MD as Radiation Oncologist (Radiation Oncology)  Barrera Conde MD PhD as Consulting Physician (Hematology and Oncology)    Date of Service: 2025     SUBJECTIVE  History of Present Illness:   Serafin Salgado is a 65 y.o. male with BCR after prostatectomy for prostate cancer, initially GG4 iPSA 21.74 pT3aN0 +focal WILLIS, +PNI, +margins (right and left anterior), surgery 3/2023, post-op PSA undetectable with subsequent rise to 0.22. DECIPHER intermediate risk, 0.48. S/p prostate bed RT 66 Gy in 33 fx completed 2024. Patient opted against ADT. PSA continued to rise after radiotherapy, rising to 0.89 in 2025. PSMA PET on 25 showed no evidence of local recurrence in the prostate bed, and no evidence of sanjeev or distant metastases.    Since the patient's last visit with me, he established with Dr. Barrera Conde of  oncology who discussed the potential for enzalutamide +/- ADT based on EMBARK data if his PSA rises above 1 and PSADT remains < 9 months.  On 2025 the patient underwent excision of a right parotid gland Warthin's tumor with Dr. Gomez.    PSA on 25 is 0.93 ng/ml.  Currently his PSA doubling time is approximately 6 months.   He states no urinary or bowel changes since last visit.  He denies new bone pains.  He states he is recovering from his parotidectomy well however with altered sensation over the ear and cheek.     Treatment Rendered:   IMRT: Prostate bed (Resolved)    Treatment Period Technique Fraction Dose Fractions Total Dose   Course 1 2024-2024  (days elapsed: 47)         ProstBed 2024-2024 VMAT 200 / 200 cGy  6,600 / 6,600 cGy       Review of Systems:   Review of Systems - Oncology  -  please see nursing note    Performance Status:   The Karnofsky performance scale today is 90, Able to carry on normal activity; minor signs or symptoms of disease (ECOG equivalent 0).      OBJECTIVE  /64   Pulse 84   Temp 36.1 °C (97 °F)   Resp 18   Wt 98.8 kg (217 lb 12.8 oz)   SpO2 95%   BMI 29.54 kg/m²    Physical Exam  Vitals reviewed.   Constitutional:       General: He is not in acute distress.  HENT:      Head: Normocephalic and atraumatic.   Pulmonary:      Effort: Pulmonary effort is normal. No respiratory distress.   Abdominal:      General: There is no distension.     Skin:     Findings: No erythema.     Neurological:      Mental Status: He is alert and oriented to person, place, and time.     Psychiatric:         Mood and Affect: Mood normal.         Behavior: Behavior normal.           ASSESSMENT:  Serafin Salgado is a 65 y.o. male with BCR after prostatectomy for prostate cancer, initially GG4 iPSA 21.74 pT3aN0 +focal WILLIS, +PNI, +margins (right and left anterior), surgery 3/2023, post-op PSA undetectable with subsequent rise to 0.22. DECIPHER intermediate risk, 0.48. S/p prostate bed RT 66 Gy in 33 fx completed 6/2024. Patient opted against ADT. PSA continued to rise after radiotherapy, rising to 0.89 in 4/2025. PSMA PET on 4/18/25 showed no evidence of local recurrence in the prostate bed, and no evidence of sanjeev or distant metastases.    PSA on 7/31/25 is 0.93 ng/ml.  Currently his PSA doubling time is approximately 6 months.    PLAN:   Discussed PSA result and trend with the patient and his wife.  His PSA did not significantly rise in the last 3 months. Will continue monitoring with repeat PSA in 3 months.  Once his PSA rises above 1, will plan for a repeat PSMA PET scan.  We discussed that if limited, localizable disease is found on PSMA PET,  radiotherapy can be used as part of the treatment strategy.  He is also a potential candidate for enzalutamide +/- ADT.     NCCN Guidelines were  applicable to guide this patients treatment plan. Effort is required for continued longitudinal patient care.    Thank you for allowing me to participate in this patient's care.    Jluis Cooper MD  Department of Radiation Oncology  [facility]    Portions of this note were generated using voice recognition software, and may be subject to transcription errors.

## 2025-08-07 ENCOUNTER — TELEMEDICINE (OUTPATIENT)
Dept: HEMATOLOGY/ONCOLOGY | Facility: CLINIC | Age: 65
End: 2025-08-07
Payer: COMMERCIAL

## 2025-08-07 DIAGNOSIS — C61 PROSTATE CANCER (MULTI): Primary | ICD-10-CM

## 2025-08-07 PROCEDURE — 99214 OFFICE O/P EST MOD 30 MIN: CPT | Performed by: STUDENT IN AN ORGANIZED HEALTH CARE EDUCATION/TRAINING PROGRAM

## 2025-08-10 NOTE — PROGRESS NOTES
Oncology Return Visit    Patient ID: Serafin Salgado is a 65 y.o. male who presents for follow up of prostate cancer  Primary Care Provider: No Assigned PCP Generic Provider, MD    Patient Timeline    Date  Event    11/28/22 PSA 21.74     1/11/23 Prostate biopsy - Mane GG4 prostate cancer     3/16/23 Radical prostatectomy - Mane GG4 cancer with EPE, positive R anterior or L anterior margins     4/27/23 PSA < 0.1     7/25/23 PSA < 0.1     1/24/24 PSA 0.18     3/18/24 PSA 0.22     5/9/24  Start salvage radiation to prostate bed, 6600 cGy/33fx     9/23/25 PSA increased to 0.35     4/9/25  PSA 0.89     4/18/25 PSMA PET negative for any metastatic sites of disease    7/31/25 PSA 0.93    Cancer Staging   Prostate cancer (Multi)  Staging form: Prostate, AJCC 8th Edition  - Pathologic stage from 3/8/2024: Stage IIIB (pT3a, pN0, cM0, PSA: 21.7, Grade Group: 4) - Signed by Jluis Cooper MD on 3/10/2024    [facility]    Virtual or Telephone Consent    An interactive audio and video telecommunication system which permits real time communications between the patient (at the originating site) and provider (at the distant site) was utilized to provide this telehealth service.   Verbal consent was requested and obtained from Serafin Salgado on this date, 08/10/25 for a telehealth visit and the patient's location was confirmed at the time of the visit.     Serafin Salgado presents unaccompanied for virtual visit.  He feels well, with good energy level and appetite. He has no urinary issues, including no dysuria, hematuria, or incontinence. He reports no fevers, chills, night sweats, dyspnea, chest pain, abdominal pain, nausea, vomiting, diarrhea, constipation, extremity weakness, neuropathy, skin changes/rash, easy bleeding/bruising, vision changes, or headaches.    ROS    A 14 point review of systems was performed and is negative unless otherwise stated in the HPI    PMH    Medical History[1]     Medications    Current Outpatient Medications  "  Medication Instructions    atorvastatin (Lipitor) 40 mg tablet Take by mouth.    BD Ultra-Fine Mini Pen Needle 31 gauge x 3/16\" needle     cholecalciferol (Vitamin D-3) 50,000 unit capsule 1 capsule, Every 30 days    diazePAM (VALIUM) 5 mg, oral, Once, Take prior to PET scan    HumaLOG KwikPen Insulin 100 unit/mL injection     metFORMIN (Glucophage) 500 mg tablet 1 tablet, 2 times daily    nitroglycerin (Nitrostat) 0.4 mg SL tablet DISSOLVE 1 TABLET UNDER TONGUE EVERY 5 MINUTES FOR 3 DOSES AS NEEDED THEN ONLY THEN CONTACT EMS    omega-3 1,000 mg capsule capsule 1 capsule, 2 times daily    Plavix 75 mg tablet Take by mouth.    Semglee,insulin glarg-yfgn,Pen 100 unit/mL (3 mL) Pen       Fam Hx    Family History[2]    Social Hx    Works as an [employer] tech. 20 pack-year smoking history. No alcohol or recreational drug use.     Objective     Physical Examination    Vitals  There were no vitals taken for this visit.  BSA: There is no height or weight on file to calculate BSA.    Performance Status:  Asymptomatic (ECO)    Physical Exam    GENERAL: Well appearing, in no apparent distress. Remainder of examination deferred due to virtual visit    Results    Labs  Lab Results   Component Value Date    WBC 8.1 2025    HGB 15.9 2025    HCT 49.3 2025    MCV 88 2025     2025     Lab Results   Component Value Date    GLUCOSE 147 (H) 2025    CALCIUM 9.9 2025     2025    K 4.3 2025    CO2 33 (H) 2025    CL 98 2025    BUN 13 2025    CREATININE 0.71 2025     Lab Results   Component Value Date    ALT 10 2024    AST 12 2024    ALKPHOS 61 2024    BILITOT 0.7 2024      Lab Results   Component Value Date    TSH 2.37 2024      PSA Trend  Lab Results   Component Value Date    PSA 0.93 2025    PSA 0.89 2025    PSA 0.40 2024     PSA Trend     22 21.74  23 < 0.1  23 < " 0.1  1/24/24 0.18  3/18/24 0.22  9/23/24 0.35  12/30/24 0.40  4/9/25  0.89  7/31/25 0.93     Imaging    Imaging personally reviewed in EHR and summarized in prior notes. No new imaging.    Genomics    Germline:  None  Somatic:  None    Assessment/Plan    Serafin Salgado is a 65 y.o. year old male initially diagnosed with high risk (cT3, iPSA 21.7, GG4) prostate cancer and underwent radical prostatectomy in March 2023. He developed biochemical recurrence and underwent salvage radiation in June 2024. PSA, however has continued to rise with doubling time < 6 months. PSMA PET was obtained and was negative.     PSA only has a slight rise over the past 3 months and remains < 1. If PSA > 1, we will obtain PSMA PET imaging and consider further treatment for biochemical recurrence with enzalutamide +/- ADT. We will recheck PSA in 3 months.    # Biochemically recurrent prostate cancer  - Follows with urology (Dr. Peguero); s/p prostatectomy  - Follows with radiation oncology (Dr. ESSIE Meraz); s/p salvage radiation  - Recheck PSA in 3 months  - Repeat PSMA PET if PSA > 1  - Would be candidate for enzalutamide +/- ADT once PSA > 1    The above plan was discussed with the patient, and he was in agreement. All questions were answered to his satisfaction.    RV 3 months with labs (PSA, Testosterone)    More than 30 minutes were spent in face-to-face encounter, review of medical records, coordination of care, and documentation.       [1]   Past Medical History:  Diagnosis Date    CAD (coronary artery disease)     DM2 (diabetes mellitus, type 2) (Multi)     HLD (hyperlipidemia)     HTN (hypertension)     Osteoarthritis     Parotid mass     Personal history of irradiation last year    Prostate cancer (Multi)    [2]   Family History  Problem Relation Name Age of Onset    Diabetes Mother      Pancreatic cancer Father      Diabetes Father      Cancer Father Zander Damian     Diabetes Mother Zacharyda Damian

## 2025-09-04 ENCOUNTER — LAB (OUTPATIENT)
Dept: LAB | Facility: CLINIC | Age: 65
End: 2025-09-04
Payer: COMMERCIAL

## 2025-09-04 DIAGNOSIS — E11.9 TYPE 2 DIABETES MELLITUS WITHOUT COMPLICATIONS: ICD-10-CM

## 2025-09-04 DIAGNOSIS — E78.5 HYPERLIPIDEMIA, UNSPECIFIED: Primary | ICD-10-CM

## 2025-09-04 LAB
ALBUMIN SERPL BCP-MCNC: 4.3 G/DL (ref 3.4–5)
ALP SERPL-CCNC: 68 U/L (ref 33–136)
ALT SERPL W P-5'-P-CCNC: 7 U/L (ref 10–52)
ANION GAP SERPL CALC-SCNC: 11 MMOL/L (ref 10–20)
AST SERPL W P-5'-P-CCNC: 10 U/L (ref 9–39)
BILIRUB SERPL-MCNC: 0.9 MG/DL (ref 0–1.2)
BUN SERPL-MCNC: 13 MG/DL (ref 6–23)
CALCIUM SERPL-MCNC: 9.9 MG/DL (ref 8.6–10.3)
CHLORIDE SERPL-SCNC: 102 MMOL/L (ref 98–107)
CHOLEST SERPL-MCNC: 175 MG/DL (ref 0–199)
CHOLESTEROL/HDL RATIO: 5
CO2 SERPL-SCNC: 31 MMOL/L (ref 21–32)
CREAT SERPL-MCNC: 0.82 MG/DL (ref 0.5–1.3)
EGFRCR SERPLBLD CKD-EPI 2021: >90 ML/MIN/1.73M*2
EST. AVERAGE GLUCOSE BLD GHB EST-MCNC: 163 MG/DL
GLUCOSE SERPL-MCNC: 70 MG/DL (ref 74–99)
HBA1C MFR BLD: 7.3 % (ref ?–5.7)
HDLC SERPL-MCNC: 35.3 MG/DL
LDLC SERPL CALC-MCNC: 88 MG/DL
NON HDL CHOLESTEROL: 140 MG/DL (ref 0–149)
POTASSIUM SERPL-SCNC: 4.1 MMOL/L (ref 3.5–5.3)
PROT SERPL-MCNC: 7.1 G/DL (ref 6.4–8.2)
SODIUM SERPL-SCNC: 140 MMOL/L (ref 136–145)
TRIGL SERPL-MCNC: 258 MG/DL (ref 0–149)
VLDL: 52 MG/DL (ref 0–40)

## 2025-09-04 PROCEDURE — 36415 COLL VENOUS BLD VENIPUNCTURE: CPT

## 2025-09-04 PROCEDURE — 80061 LIPID PANEL: CPT

## 2025-09-04 PROCEDURE — 80053 COMPREHEN METABOLIC PANEL: CPT

## 2025-09-04 PROCEDURE — 83036 HEMOGLOBIN GLYCOSYLATED A1C: CPT | Mod: PARLAB

## 2025-09-05 LAB
ATRIAL RATE: 90 BPM
P AXIS: 62 DEGREES
P OFFSET: 209 MS
P ONSET: 159 MS
PR INTERVAL: 120 MS
Q ONSET: 219 MS
QRS COUNT: 14 BEATS
QRS DURATION: 138 MS
QT INTERVAL: 368 MS
QTC CALCULATION(BAZETT): 450 MS
QTC FREDERICIA: 421 MS
R AXIS: 78 DEGREES
T AXIS: 30 DEGREES
T OFFSET: 403 MS
VENTRICULAR RATE: 90 BPM

## (undated) DEVICE — SOLUTION, IRRIGATION, SODIUM CHLORIDE 0.9%, 1000 ML, POUR BOTTLE

## (undated) DEVICE — CORD, CAUTERY, BIOPOLAR FORCEP, 12FT

## (undated) DEVICE — STAY SET, SURGICAL , 5MM SHARP HOOK, CS/ 50

## (undated) DEVICE — SUTURE, SILK, 2-0, 30 IN, SH, BLACK

## (undated) DEVICE — PREP TRAY, VAGINAL

## (undated) DEVICE — NEEDLE, ELECTRODE, ELECTROSURGICAL, INSULATED

## (undated) DEVICE — SUTURE, MONOCRYL, 4-0, 27 IN, PS-2, UNDYED

## (undated) DEVICE — CLIP, LIGATING, W/ADHESIVE, WIDE SLOT, SMALL, TITANIUM

## (undated) DEVICE — CLIP, LIGATING, W/ADHESIVE PAD, MEDIUM, TITANIUM

## (undated) DEVICE — BLADE, ULTRA CLEAN, BOVIE MOD TIP, 1IN

## (undated) DEVICE — SOLUTION, IRRIGATION, STERILE WATER, 1000 ML, POUR BOTTLE

## (undated) DEVICE — Device

## (undated) DEVICE — TIP, SUCTION, YANKAUER, FLEXIBLE

## (undated) DEVICE — GLOVE, SURGICAL, PROTEXIS PI , 7.5, PF, LF

## (undated) DEVICE — SPONGE, HEMOSTAT, SURGICEL FIBRILLAR, ABS, 4 X 4, LF

## (undated) DEVICE — STAPLER, SKIN, PLUS, WIDE, 35

## (undated) DEVICE — SPONGE, DISSECTOR, PEANUT, 3/8, STERILE 5 FOAM HOLDER"

## (undated) DEVICE — SUTURE, VICRYL, 3-0,18 IN, SH, UNDYED

## (undated) DEVICE — ELECTRODE, PAIRED SUBDERMAL OTO

## (undated) DEVICE — PROBE, STIMULATOR, MONOPOLAR, FLUSH TIP, PRASS, W/HANDLE, STANDARD

## (undated) DEVICE — TOWEL PACK, STERILE, 4/PACK, BLUE

## (undated) DEVICE — SHEARS, CURVED 9CM HARMONIC FOCUS

## (undated) DEVICE — SUTURE, VELOSORB FAST, 5-0 P-13, 18 IN